# Patient Record
Sex: MALE | Race: WHITE | NOT HISPANIC OR LATINO | Employment: FULL TIME | URBAN - METROPOLITAN AREA
[De-identification: names, ages, dates, MRNs, and addresses within clinical notes are randomized per-mention and may not be internally consistent; named-entity substitution may affect disease eponyms.]

---

## 2017-01-06 ENCOUNTER — OFFICE VISIT - HEALTHEAST (OUTPATIENT)
Dept: INTERNAL MEDICINE | Facility: CLINIC | Age: 67
End: 2017-01-06

## 2017-01-06 DIAGNOSIS — M10.9 GOUT ATTACK: ICD-10-CM

## 2017-01-06 DIAGNOSIS — Z00.00 HEALTH CARE MAINTENANCE: ICD-10-CM

## 2017-01-06 DIAGNOSIS — I25.10 ASCVD (ARTERIOSCLEROTIC CARDIOVASCULAR DISEASE): ICD-10-CM

## 2017-01-06 DIAGNOSIS — E78.5 HYPERLIPIDEMIA: ICD-10-CM

## 2017-01-06 RX ORDER — NITROGLYCERIN 0.4 MG/1
0.4 TABLET SUBLINGUAL
Status: SHIPPED | COMMUNITY
Start: 2014-12-18

## 2017-01-06 RX ORDER — ASCORBIC ACID 500 MG
500 TABLET ORAL
Status: SHIPPED | COMMUNITY
Start: 2017-01-06

## 2017-01-06 RX ORDER — MULTIVIT-MIN/IRON FUM/FOLIC AC 7.5 MG-4
1 TABLET ORAL
Status: SHIPPED | COMMUNITY
Start: 2017-01-06

## 2017-01-07 ENCOUNTER — COMMUNICATION - HEALTHEAST (OUTPATIENT)
Dept: INTERNAL MEDICINE | Facility: CLINIC | Age: 67
End: 2017-01-07

## 2017-06-06 ENCOUNTER — COMMUNICATION - HEALTHEAST (OUTPATIENT)
Dept: INTERNAL MEDICINE | Facility: CLINIC | Age: 67
End: 2017-06-06

## 2017-06-06 ENCOUNTER — OFFICE VISIT - HEALTHEAST (OUTPATIENT)
Dept: INTERNAL MEDICINE | Facility: CLINIC | Age: 67
End: 2017-06-06

## 2017-06-06 DIAGNOSIS — Z12.11 SCREENING FOR COLON CANCER: ICD-10-CM

## 2017-06-06 DIAGNOSIS — Z00.00 HEALTH CARE MAINTENANCE: ICD-10-CM

## 2017-06-06 DIAGNOSIS — Z23 NEED FOR PROPHYLACTIC VACCINATION WITH TETANUS-DIPHTHERIA (TD): ICD-10-CM

## 2017-06-06 DIAGNOSIS — Z98.61 S/P CORONARY ANGIOPLASTY: ICD-10-CM

## 2017-06-06 DIAGNOSIS — M10.9 GOUTY ARTHROPATHY: ICD-10-CM

## 2017-06-06 DIAGNOSIS — Z12.5 PROSTATE CANCER SCREENING: ICD-10-CM

## 2017-06-06 DIAGNOSIS — Z95.5 S/P CORONARY ARTERY STENT PLACEMENT: ICD-10-CM
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
## 2017-06-06 DIAGNOSIS — E78.5 HYPERLIPEMIA: ICD-10-CM

## 2017-06-06 LAB — LDLC SERPL CALC-MCNC: 64 MG/DL

## 2017-06-06 ASSESSMENT — MIFFLIN-ST. JEOR: SCORE: 1597

## 2017-06-07 LAB — PSA SERPL-MCNC: 0.6 NG/ML (ref 0–4.5)

## 2017-07-03 ENCOUNTER — OFFICE VISIT - HEALTHEAST (OUTPATIENT)
Dept: INTERNAL MEDICINE | Facility: CLINIC | Age: 67
End: 2017-07-03

## 2017-07-03 ENCOUNTER — COMMUNICATION - HEALTHEAST (OUTPATIENT)
Dept: SCHEDULING | Facility: CLINIC | Age: 67
End: 2017-07-03

## 2017-07-03 DIAGNOSIS — M10.9 GOUT: ICD-10-CM

## 2017-07-05 ENCOUNTER — COMMUNICATION - HEALTHEAST (OUTPATIENT)
Dept: INTERNAL MEDICINE | Facility: CLINIC | Age: 67
End: 2017-07-05

## 2017-07-10 ENCOUNTER — COMMUNICATION - HEALTHEAST (OUTPATIENT)
Dept: SCHEDULING | Facility: CLINIC | Age: 67
End: 2017-07-10

## 2017-10-16 ENCOUNTER — COMMUNICATION - HEALTHEAST (OUTPATIENT)
Dept: SCHEDULING | Facility: CLINIC | Age: 67
End: 2017-10-16

## 2017-10-17 ENCOUNTER — AMBULATORY - HEALTHEAST (OUTPATIENT)
Dept: INTERNAL MEDICINE | Facility: CLINIC | Age: 67
End: 2017-10-17

## 2018-01-22 ENCOUNTER — AMBULATORY - HEALTHEAST (OUTPATIENT)
Dept: LAB | Facility: CLINIC | Age: 68
End: 2018-01-22

## 2018-01-22 DIAGNOSIS — I25.83 CORONARY ARTERY DISEASE DUE TO LIPID RICH PLAQUE: ICD-10-CM

## 2018-01-22 DIAGNOSIS — I25.10 CORONARY ARTERY DISEASE DUE TO LIPID RICH PLAQUE: ICD-10-CM

## 2018-01-31 ENCOUNTER — COMMUNICATION - HEALTHEAST (OUTPATIENT)
Dept: INTERNAL MEDICINE | Facility: CLINIC | Age: 68
End: 2018-01-31

## 2018-02-23 ENCOUNTER — OFFICE VISIT - HEALTHEAST (OUTPATIENT)
Dept: INTERNAL MEDICINE | Facility: CLINIC | Age: 68
End: 2018-02-23

## 2018-02-23 ENCOUNTER — HOSPITAL ENCOUNTER (OUTPATIENT)
Dept: LAB | Age: 68
Setting detail: SPECIMEN
Discharge: HOME | End: 2018-02-23

## 2018-02-23 DIAGNOSIS — Z98.61 S/P CORONARY ANGIOPLASTY: ICD-10-CM

## 2018-02-23 DIAGNOSIS — I25.83 CORONARY ARTERY DISEASE DUE TO LIPID RICH PLAQUE: ICD-10-CM

## 2018-02-23 DIAGNOSIS — Z95.5 S/P CORONARY ARTERY STENT PLACEMENT: ICD-10-CM

## 2018-02-23 DIAGNOSIS — E78.5 HYPERLIPEMIA: ICD-10-CM

## 2018-02-23 DIAGNOSIS — M75.100 ROTATOR CUFF TEAR: ICD-10-CM

## 2018-02-23 DIAGNOSIS — Z95.5 S/P DRUG ELUTING CORONARY STENT PLACEMENT: ICD-10-CM

## 2018-02-23 DIAGNOSIS — I25.10 CORONARY ARTERY DISEASE DUE TO LIPID RICH PLAQUE: ICD-10-CM

## 2018-02-23 DIAGNOSIS — Z01.818 PREOP EXAM FOR INTERNAL MEDICINE: ICD-10-CM

## 2018-02-23 DIAGNOSIS — M10.9 GOUT: ICD-10-CM

## 2018-02-23 LAB
ALBUMIN SERPL-MCNC: 4.1 G/DL (ref 3.5–5)
ALP SERPL-CCNC: 83 U/L (ref 45–120)
ALT SERPL W P-5'-P-CCNC: 27 U/L (ref 0–45)
ANION GAP SERPL CALCULATED.3IONS-SCNC: 9 MMOL/L (ref 5–18)
AST SERPL W P-5'-P-CCNC: 26 U/L (ref 0–40)
BILIRUB SERPL-MCNC: 2 MG/DL (ref 0–1)
BUN SERPL-MCNC: 21 MG/DL (ref 8–22)
CALCIUM SERPL-MCNC: 9.4 MG/DL (ref 8.5–10.5)
CHLORIDE BLD-SCNC: 105 MMOL/L (ref 98–107)
CHOLEST SERPL-MCNC: 167 MG/DL
CO2 SERPL-SCNC: 25 MMOL/L (ref 22–31)
CREAT SERPL-MCNC: 1.07 MG/DL (ref 0.7–1.3)
ERYTHROCYTE [DISTWIDTH] IN BLOOD BY AUTOMATED COUNT: 11 % (ref 11–14.5)
FASTING STATUS PATIENT QL REPORTED: YES
GFR SERPL CREATININE-BSD FRML MDRD: >60 ML/MIN/1.73M2
GLUCOSE BLD-MCNC: 90 MG/DL (ref 70–125)
HCT VFR BLD AUTO: 42.2 % (ref 40–54)
HDLC SERPL-MCNC: 63 MG/DL
HGB BLD-MCNC: 14.6 G/DL (ref 14–18)
LDLC SERPL CALC-MCNC: 90 MG/DL
MCH RBC QN AUTO: 32.2 PG (ref 27–34)
MCHC RBC AUTO-ENTMCNC: 34.6 G/DL (ref 32–36)
MCV RBC AUTO: 93 FL (ref 80–100)
PLATELET # BLD AUTO: 206 THOU/UL (ref 140–440)
PMV BLD AUTO: 7 FL (ref 7–10)
POTASSIUM BLD-SCNC: 4 MMOL/L (ref 3.5–5)
PROT SERPL-MCNC: 7 G/DL (ref 6–8)
RBC # BLD AUTO: 4.53 MILL/UL (ref 4.4–6.2)
SODIUM SERPL-SCNC: 139 MMOL/L (ref 136–145)
TRIGL SERPL-MCNC: 71 MG/DL
URATE SERPL-MCNC: 6.3 MG/DL (ref 3–8)
WBC: 5.5 THOU/UL (ref 4–11)

## 2018-02-23 ASSESSMENT — MIFFLIN-ST. JEOR: SCORE: 1548.93

## 2018-02-24 LAB
ATRIAL RATE - MUSE: 52 BPM
DIASTOLIC BLOOD PRESSURE - MUSE: NORMAL MMHG
INTERPRETATION ECG - MUSE: NORMAL
P AXIS - MUSE: 57 DEGREES
PR INTERVAL - MUSE: 184 MS
QRS DURATION - MUSE: 96 MS
QT - MUSE: 444 MS
QTC - MUSE: 412 MS
R AXIS - MUSE: -7 DEGREES
SYSTOLIC BLOOD PRESSURE - MUSE: NORMAL MMHG
T AXIS - MUSE: 9 DEGREES
VENTRICULAR RATE- MUSE: 52 BPM

## 2018-02-27 ENCOUNTER — COMMUNICATION - HEALTHEAST (OUTPATIENT)
Dept: INTERNAL MEDICINE | Facility: CLINIC | Age: 68
End: 2018-02-27

## 2018-06-30 ENCOUNTER — COMMUNICATION - HEALTHEAST (OUTPATIENT)
Dept: INTERNAL MEDICINE | Facility: CLINIC | Age: 68
End: 2018-06-30

## 2018-06-30 DIAGNOSIS — M10.9 GOUT: ICD-10-CM

## 2018-12-11 ENCOUNTER — OFFICE VISIT - HEALTHEAST (OUTPATIENT)
Dept: INTERNAL MEDICINE | Facility: CLINIC | Age: 68
End: 2018-12-11

## 2018-12-11 DIAGNOSIS — M10.9 GOUTY ARTHROPATHY: ICD-10-CM

## 2018-12-19 ENCOUNTER — COMMUNICATION - HEALTHEAST (OUTPATIENT)
Dept: INTERNAL MEDICINE | Facility: CLINIC | Age: 68
End: 2018-12-19

## 2018-12-19 DIAGNOSIS — M10.9 GOUT: ICD-10-CM

## 2019-01-29 ENCOUNTER — COMMUNICATION - HEALTHEAST (OUTPATIENT)
Dept: INTERNAL MEDICINE | Facility: CLINIC | Age: 69
End: 2019-01-29

## 2019-05-13 ENCOUNTER — COMMUNICATION - HEALTHEAST (OUTPATIENT)
Dept: INTERNAL MEDICINE | Facility: CLINIC | Age: 69
End: 2019-05-13

## 2019-05-22 ENCOUNTER — COMMUNICATION - HEALTHEAST (OUTPATIENT)
Dept: INTERNAL MEDICINE | Facility: CLINIC | Age: 69
End: 2019-05-22

## 2019-05-22 DIAGNOSIS — M10.9 GOUT: ICD-10-CM

## 2020-06-13 ENCOUNTER — COMMUNICATION - HEALTHEAST (OUTPATIENT)
Dept: INTERNAL MEDICINE | Facility: CLINIC | Age: 70
End: 2020-06-13

## 2020-06-13 DIAGNOSIS — M10.9 GOUT: ICD-10-CM

## 2020-11-02 ENCOUNTER — COMMUNICATION - HEALTHEAST (OUTPATIENT)
Dept: INTERNAL MEDICINE | Facility: CLINIC | Age: 70
End: 2020-11-02

## 2020-11-02 DIAGNOSIS — M10.9 GOUT: ICD-10-CM

## 2020-11-02 RX ORDER — ALLOPURINOL 100 MG/1
TABLET ORAL
Qty: 90 TABLET | Refills: 3 | Status: SHIPPED | OUTPATIENT
Start: 2020-11-02 | End: 2021-10-19

## 2021-01-04 ENCOUNTER — OFFICE VISIT - HEALTHEAST (OUTPATIENT)
Dept: FAMILY MEDICINE | Facility: CLINIC | Age: 71
End: 2021-01-04

## 2021-01-04 DIAGNOSIS — L72.3 SEBACEOUS CYST: ICD-10-CM

## 2021-01-04 DIAGNOSIS — L72.0 MILIA: ICD-10-CM

## 2021-01-04 DIAGNOSIS — I25.10 CORONARY ARTERY DISEASE DUE TO LIPID RICH PLAQUE: ICD-10-CM

## 2021-01-04 DIAGNOSIS — M1A.09X0 IDIOPATHIC CHRONIC GOUT OF MULTIPLE SITES WITHOUT TOPHUS: ICD-10-CM

## 2021-01-04 DIAGNOSIS — Z00.00 ROUTINE MEDICAL EXAM: ICD-10-CM

## 2021-01-04 DIAGNOSIS — I25.83 CORONARY ARTERY DISEASE DUE TO LIPID RICH PLAQUE: ICD-10-CM

## 2021-01-04 ASSESSMENT — MIFFLIN-ST. JEOR: SCORE: 1538.26

## 2021-01-15 ENCOUNTER — COMMUNICATION - HEALTHEAST (OUTPATIENT)
Dept: SCHEDULING | Facility: CLINIC | Age: 71
End: 2021-01-15

## 2021-01-21 ENCOUNTER — AMBULATORY - HEALTHEAST (OUTPATIENT)
Dept: FAMILY MEDICINE | Facility: CLINIC | Age: 71
End: 2021-01-21

## 2021-01-21 DIAGNOSIS — L72.3 SEBACEOUS CYST: ICD-10-CM

## 2021-05-20 ENCOUNTER — COMMUNICATION - HEALTHEAST (OUTPATIENT)
Dept: LAB | Facility: CLINIC | Age: 71
End: 2021-05-20

## 2021-05-20 DIAGNOSIS — Z12.5 SCREENING FOR MALIGNANT NEOPLASM OF PROSTATE: ICD-10-CM

## 2021-05-20 DIAGNOSIS — I25.83 CORONARY ARTERY DISEASE DUE TO LIPID RICH PLAQUE: ICD-10-CM

## 2021-05-20 DIAGNOSIS — I25.10 CORONARY ARTERY DISEASE DUE TO LIPID RICH PLAQUE: ICD-10-CM

## 2021-05-28 ENCOUNTER — RECORDS - HEALTHEAST (OUTPATIENT)
Dept: ADMINISTRATIVE | Facility: CLINIC | Age: 71
End: 2021-05-28

## 2021-05-29 NOTE — TELEPHONE ENCOUNTER
Former patient of Dr. Curtis & has not established care with another provider.  Please assign refill request to covering provider per Clinic standard process.    RN cannot approve Refill Request    RN can NOT refill this medication overdue for office visits and/or labs.    Kd Perry, Care Connection Triage/Med Refill 5/22/2019    Requested Prescriptions   Pending Prescriptions Disp Refills     allopurinol (ZYLOPRIM) 100 MG tablet [Pharmacy Med Name: ALLOPURINOL 100 MG TABLET] 90 tablet 0     Sig: TAKE 1 TABLET BY MOUTH DAILY       Allopurinol/Febuxostat Refill Protocol  Failed - 5/22/2019 11:33 AM        Failed - LFT or AST or ALT in last 12 months     Albumin   Date Value Ref Range Status   02/23/2018 4.1 3.5 - 5.0 g/dL Final     Bilirubin, Total   Date Value Ref Range Status   02/23/2018 2.0 (H) 0.0 - 1.0 mg/dL Final     Alkaline Phosphatase   Date Value Ref Range Status   02/23/2018 83 45 - 120 U/L Final     AST   Date Value Ref Range Status   02/23/2018 26 0 - 40 U/L Final     ALT   Date Value Ref Range Status   02/23/2018 27 0 - 45 U/L Final     Protein, Total   Date Value Ref Range Status   02/23/2018 7.0 6.0 - 8.0 g/dL Final                Passed - Visit with PCP or prescribing provider visit in past 12 months     Last office visit with prescriber/PCP: 7/3/2017 Del Curtis MD OR same dept: 12/11/2018 Dannie Snider MD OR same specialty: 12/11/2018 Dannie Snider MD  Last physical: 2/23/2018 Last MTM visit: Visit date not found   Next visit within 3 mo: Visit date not found  Next physical within 3 mo: Visit date not found  Prescriber OR PCP: Del Curtis MD  Last diagnosis associated with med order: 1. Gout  - allopurinol (ZYLOPRIM) 100 MG tablet [Pharmacy Med Name: ALLOPURINOL 100 MG TABLET]; TAKE 1 TABLET BY MOUTH DAILY  Dispense: 90 tablet; Refill: 0    If protocol passes may refill for 12 months if within 3 months of last provider visit (or a total of 15 months).

## 2021-05-30 VITALS — WEIGHT: 174.8 LBS

## 2021-05-31 VITALS — BODY MASS INDEX: 24.13 KG/M2 | HEIGHT: 72 IN | WEIGHT: 178.13 LBS

## 2021-05-31 VITALS — WEIGHT: 177.6 LBS | BODY MASS INDEX: 24.26 KG/M2

## 2021-06-01 ENCOUNTER — RECORDS - HEALTHEAST (OUTPATIENT)
Dept: ADMINISTRATIVE | Facility: CLINIC | Age: 71
End: 2021-06-01

## 2021-06-01 VITALS — WEIGHT: 172 LBS | BODY MASS INDEX: 24.62 KG/M2 | HEIGHT: 70 IN

## 2021-06-05 VITALS
SYSTOLIC BLOOD PRESSURE: 130 MMHG | WEIGHT: 178.1 LBS | OXYGEN SATURATION: 98 % | BODY MASS INDEX: 25.55 KG/M2 | HEART RATE: 57 BPM | DIASTOLIC BLOOD PRESSURE: 80 MMHG

## 2021-06-05 VITALS
WEIGHT: 171.3 LBS | DIASTOLIC BLOOD PRESSURE: 78 MMHG | HEIGHT: 70 IN | SYSTOLIC BLOOD PRESSURE: 116 MMHG | BODY MASS INDEX: 24.52 KG/M2 | HEART RATE: 62 BPM | OXYGEN SATURATION: 98 %

## 2021-06-08 NOTE — PROGRESS NOTES
ASSESSMENT/PLAN:  1. Gout attack  The erythematous, warm, enflamed greater and second toes are suspicious of gout. Given his history of previous gout and lack of other signs of local or systemic infection, and infectious etiology is not likely;  Pseudogout cannot be ruled out either.  The plan is to check a uric acid level today and to begin a short duration of prednisone for the inflammation.  He agreed to the plan.  He was also given a hand out on low purine diet and educated about prevention of gout.  The plan is to follow up for further management or resolution at his physical with Dr Curtis on 1/24/16 or as needed.         Medications Discontinued During This Encounter   Medication Reason     prasugrel (EFFIENT) 10 mg Tab tablet Therapy completed       ASSESSED PROBLEMS:  1. Gout attack  predniSONE (DELTASONE) 20 MG tablet    Uric Acid   2. Hyperlipidemia  atorvastatin (LIPITOR) 80 MG tablet   3. ASCVD (arteriosclerotic cardiovascular disease)  atorvastatin (LIPITOR) 80 MG tablet    clopidogrel (PLAVIX) 75 mg tablet    nitroglycerin (NITROSTAT) 0.4 MG SL tablet   4. Health care maintenance  ascorbic acid, vitamin C, (VITAMIN C) 500 MG tablet    multivitamin with minerals tablet       CHIEF COMPLAINT:  Chief Complaint   Patient presents with     Pain     left foot first digit pain since 12-7-2016 now swelling and red       HISTORY OF PRESENT ILLNESS:  Boyd is a 66 y.o. male presenting to the clinic today for inflammation of his left grater toe and left second toe .  Swelling and inflammation began suddenly when he awoke in the morning on 12/27/16.  Initially, the swelling was limited to his left heel, but the heel pain and inflamation dissipated after the first few days as his left great metatarsal head and second toe began to hurt, especially when he stands on his left foot.  The second toe inflammation has decreased in the past few days and the discomfort is much improved as well. He has had gout flare ups  on three other occasions in the past few years. He can usually anticipate a gouty attack through some anomalous prescience lets him know he is about to have an attack, so he increases his water intake.  He avoids red wine, he does not drink very often, avoids excessive meat intake, but would like information about foods to avoid. He went on a skiing trip in [place] and had to stay at the [facility] and read due to his discomfort.  He had an AMI in July and is on Plavix, he stopped his Plavix and started ibuprofen for two days.  He informed his cardiologist and was told to restart the Plavix, and he was told to see his general practitioner for the toe inflammation.  His uric acid in 2012 was normal at 6.1.      REVIEW OF SYSTEMS:    He has a history of an MI in July. He denies and heart palpitation, chest pain, or shortness of breath with exertion.  He denied any fever, chills, malaise, cough, change in urin or stool patterns, hematochezia, abdominal pain, dysuria, or hematuria.   All other systems are negative.    PFSH:  He skis with his family annually in [place], but had to forego skiing this year due to gout, as above.  He is active otherwise. He does not drink. He does not enjoy anchovies.     TOBACCO USE:  History   Smoking Status     Never Smoker   Smokeless Tobacco     Not on file       VITALS:  Vitals:    01/06/17 0945   BP: 112/84   Patient Site: Left Arm   Patient Position: Sitting   Cuff Size: Adult Regular   Pulse: 80   Weight: 174 lb 12.8 oz (79.3 kg)     Wt Readings from Last 3 Encounters:   01/06/17 174 lb 12.8 oz (79.3 kg)   12/26/14 175 lb 1.6 oz (79.4 kg)     There is no height or weight on file to calculate BMI.    PHYSICAL EXAM:  Student Physical Exam:  Constitutional:   Reveals a pleasant, well groomed,  male.  Vitals: per nursing notes.  HEENT:  Ears:  External canals, TMs clear.  Eyes:  Conjunctiva pink.  Oropharynx:   Mouth and throat pink and free of thrush, exudate, or  lesions.  Neck:  Supple, no adenopathy.  Lungs: Clear to anterior posterior and lateral; Respiratory effort normal.  Cardiac:   Regular rate and rhythm, normal S1, S2, no murmur or gallop. Radial pulse regular.  Extremities:  No edema.  Left first toe metatarsal phlangeal joint and left second toe PIP red, swollen, enflamed, warm, painful to palpation.    Skin:  No jaundice, peripheral cyanosis or lesions.  Toenail: nail fungus on all left toe nails.   Neuro:  Alert and oriented. Cranial nerves, motor, sensory exams are intact.  No gross focal deficits.  Psychiatric:  Memory intact, mood appropriate.    ADDITIONAL HISTORY SUMMARIZED (2): Notes from Dr. Curtis from 4/9/2014 reviewed regarding gout.  DECISION TO OBTAIN EXTRA INFORMATION (1): Care everywhere accessed.   RADIOLOGY TESTS (1): None.  LABS (1): Labs ordered.  MEDICINE TESTS (1): None.  INDEPENDENT REVIEW (2 each): None.     The visit lasted a total of 21 minutes face to face with the patient. Over 50% of the time was spent counseling and educating the patient about Gout. An untimed portion of this visit was spent with Abraham Chaney, a NP student. This portion was spent gathering history and this history along with the history of Dr. Vizcaino has been merged as seen above in the HPI, ROS, and PFSH.    I, Moses Mckenzie, am scribing for and in the presence of, Dr. Vizcaino.    I, Dr. Vizcaino, personally performed the services described in this documentation, as scribed by Moses Mckenzie in my presence, and it is both accurate and complete.    MEDICATIONS:  Current Outpatient Prescriptions   Medication Sig Dispense Refill     ascorbic acid, vitamin C, (VITAMIN C) 500 MG tablet Take 500 mg by mouth.       aspirin 81 MG EC tablet Take 81 mg by mouth daily.       atorvastatin (LIPITOR) 80 MG tablet Take 80 mg by mouth.       clopidogrel (PLAVIX) 75 mg tablet Take 75 mg by mouth.       multivitamin with minerals tablet Take 1 tablet by mouth.       nitroglycerin  (NITROSTAT) 0.4 MG SL tablet Place 0.4 mg under the tongue.       No current facility-administered medications for this visit.        Total data points: 4.

## 2021-06-11 NOTE — PROGRESS NOTES
Assessment and Plan:   Chart reviewed and updated. Goes to Dr. Burger for CAD- last intervention July RCA- stent. asymptomatic    1. Health care maintenance  Routine labs ordered    2. Screening for colon cancer  No interested and again refuses colonoscopy/stool heme tests- we've discussed this for years    3. Need for prophylactic vaccination with tetanus-diphtheria (TD)    - Td, Adult, Adsorbed (blue label)  - Pneumococcal polysaccharide vaccine 23-valent 1 yo or older, subq/IM    4. Hyperlipemia  On lipitor - 80 same  - Lipid Cascade  - Comprehensive Metabolic Panel  - Thyroid Stimulating Hormone (TSH)  - HM2(CBC w/o Differential)    5. CAD stable goes to [facility]    6. Tick bite yesterday- one dose of doxy ordered, prophylaxis    The patient's current medical problems were reviewed.    I have had an Advance Directives discussion with the patient.  The following health maintenance schedule was reviewed with the patient and provided in printed form in the after visit summary:   Health Maintenance   Topic Date Due     COLONOSCOPY  09/16/1967     ZOSTER VACCINE  09/16/2009     PNEUMOCOCCAL POLYSACCHARIDE VACCINE AGE 65 AND OVER  09/16/2014     PNEUMOCOCCAL CONJUGATE VACCINE FOR ADULTS (PCV13 OR PREVNAR)  09/16/2014     FALL RISK ASSESSMENT  01/06/2018     ADVANCE DIRECTIVES DISCUSSED WITH PATIENT  07/15/2018     TD 18+ HE  06/06/2027     INFLUENZA VACCINE RULE BASED  Addressed        Subjective:   Chief Complaint: Boyd Veras is an 66 y.o. male here for an Annual Wellness visit.   HPI:  Doing well has CAD and strong FH. On statin and goes to Cards at [facility]. Not interested in Colon Ca screen all else is fine    Review of Systems:    Please see above.  The rest of the review of systems are negative for all systems.    Patient Care Team:  Del Curtis MD as PCP - General     Patient Active Problem List   Diagnosis     Gout     Benign Skin Neoplasm     Hyperlipidemia     NSTEMI (non-ST elevated  "myocardial infarction)     Coronary artery disease     S/P coronary artery stent placement     Sinus bradycardia     Disorder of rotator cuff     Family history of coronary artery disease     Rupture of biceps tendon     S/P coronary angioplasty     Past Medical History:   Diagnosis Date     Gout      Hyperlipidemia      Stented coronary artery Dec 2014    @[facility]; Dr. David Nicholson      No past surgical history on file.   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       aspirin 81 MG EC tablet Take 81 mg by mouth daily.       atorvastatin (LIPITOR) 80 MG tablet Take 80 mg by mouth.       clopidogrel (PLAVIX) 75 mg tablet Take 75 mg by mouth.       doxycycline (VIBRA-TABS) 100 MG tablet Two tablets one time dose (tick bite prophylaxis) 2 tablet 0     multivitamin with minerals tablet Take 1 tablet by mouth.       nitroglycerin (NITROSTAT) 0.4 MG SL tablet Place 0.4 mg under the tongue.       No current facility-administered medications for this visit.       Objective:   Vital Signs:   Visit Vitals     /78 (Patient Site: Left Arm, Patient Position: Sitting, Cuff Size: Adult Regular)     Pulse (!) 54     Resp 16     Ht 5' 11.75\" (1.822 m)     Wt 178 lb 2 oz (80.8 kg)     SpO2 99%     BMI 24.33 " kg/m2        VisionScreening:  No exam data present     PHYSICAL EXAM  Healthy appearing. Looks younger than stated age.   HEENT- negative  Neck- no bruits  Chest- clear  Cor - regular no murmur  Abd- scaphoid/benign  Ext - good distal pulse  Skin- small inclusion cyst right buttock area. Not suspicious. No evidence of tick bite or rash left upper chest  Neuro- negative    Assessment Results 6/6/2017   Activities of Daily Living No help needed   Instrumental Activities of Daily Living No help needed   Get Up and Go Score Less than 12 seconds   Mini Cog Total Score 3     A Mini-Cog score of 0-2 suggests the possibility of dementia, score of 3-5 suggests no dementia    Identified Health Risks:     Information regarding advance directives (living porter), including where he can download the appropriate form, was provided to the patient via the AVS.     The last exams were  Recent Results (from the past 240 hour(s))   Comprehensive Metabolic Panel   Result Value Ref Range    Sodium 141 136 - 145 mmol/L    Potassium 4.2 3.5 - 5.0 mmol/L    Chloride 106 98 - 107 mmol/L    CO2 25 22 - 31 mmol/L    Anion Gap, Calculation 10 5 - 18 mmol/L    Glucose 92 70 - 125 mg/dL    BUN 21 8 - 22 mg/dL    Creatinine 0.97 0.70 - 1.30 mg/dL    GFR MDRD Af Amer >60 >60 mL/min/1.73m2    GFR MDRD Non Af Amer >60 >60 mL/min/1.73m2    Bilirubin, Total 2.1 (H) 0.0 - 1.0 mg/dL    Calcium 9.3 8.5 - 10.5 mg/dL    Protein, Total 6.9 6.0 - 8.0 g/dL    Albumin 4.0 3.5 - 5.0 g/dL    Alkaline Phosphatase 85 45 - 120 U/L    AST 26 0 - 40 U/L    ALT 26 0 - 45 U/L   Thyroid Stimulating Hormone (TSH)   Result Value Ref Range    TSH 1.20 0.30 - 5.00 uIU/mL   HM2(CBC w/o Differential)   Result Value Ref Range    WBC 4.9 4.0 - 11.0 thou/uL    RBC 4.53 4.40 - 6.20 mill/uL    Hemoglobin 14.0 14.0 - 18.0 g/dL    Hematocrit 40.4 40.0 - 54.0 %    MCV 89 80 - 100 fL    MCH 30.8 27.0 - 34.0 pg    MCHC 34.5 32.0 - 36.0 g/dL    RDW 10.9 (L) 11.0 - 14.5 %    Platelets  187 140 - 440 thou/uL    MPV 6.7 (L) 7.0 - 10.0 fL   LDL Cholesterol, Direct   Result Value Ref Range    Direct LDL 64 <=129 mg/dl

## 2021-06-11 NOTE — PROGRESS NOTES
Boyd Veras  1950      Assessment and Plan:  1. Likely gouty achilles tendonitis resolving with prednisone  2. 3rd episode of gout past 12 months- begin allopurinol; get uric acid at least <6.       Chief Complaint: gout     Visit diagnoses:    1. Gout  predniSONE (DELTASONE) 20 MG tablet    allopurinol (ZYLOPRIM) 100 MG tablet    Uric Acid       Meds:  Current Outpatient Prescriptions   Medication Sig Dispense Refill     ascorbic acid, vitamin C, (VITAMIN C) 500 MG tablet Take 500 mg by mouth.       aspirin 81 MG EC tablet Take 81 mg by mouth daily.       atorvastatin (LIPITOR) 80 MG tablet Take 80 mg by mouth.       clopidogrel (PLAVIX) 75 mg tablet Take 75 mg by mouth.       magnesium oxide 500 mg Tab Take by mouth.       multivitamin with minerals tablet Take 1 tablet by mouth.       nitroglycerin (NITROSTAT) 0.4 MG SL tablet Place 0.4 mg under the tongue.       allopurinol (ZYLOPRIM) 100 MG tablet Take 1 tablet (100 mg total) by mouth daily. (to prevent gout) 90 tablet 3     predniSONE (DELTASONE) 20 MG tablet Two po daily with food for 5 days for gout attack. 10 tablet 1     No current facility-administered medications for this visit.        Allergies   Allergen Reactions     Isosorbide Mononitrate Headache       ROS: complete review of symptoms otherwise negative except as noted below    S: Rapid onset of left Achilles tendinitis pain his heel does not bother him so it is not likely plantar fasciitis.  He started taking prednisone and now he can walk this looks like it is the third attack of gout the past 12 months his uric acid was 6.2 going to recheck it today he will need to start on allopurinol       O:   Vitals:    07/03/17 1548   BP: 122/82   Patient Site: Left Arm   Patient Position: Sitting   Cuff Size: Adult Regular   Pulse: 70   Weight: 177 lb 9.6 oz (80.6 kg)       Physical Exam:  General-  VS- see above    Extremities: no edema, good distal pulses; no heel tenderness or evidence of  plantar fasciitis on the left the left Achilles tendon now is essentially normal    Recent Results (from the past 240 hour(s))   Uric Acid   Result Value Ref Range    Uric Acid 6.9 3.0 - 8.0 mg/dL           Del Curtis MD

## 2021-06-12 NOTE — TELEPHONE ENCOUNTER
RN cannot approve Refill Request    RN can NOT refill this medication Protocol failed and NO refill given. Last office visit: Visit date not found Last Physical: Visit date not found Last MTM visit: Visit date not found Last visit same specialty: 12/11/2018 Dannie Snider MD.  Next visit within 3 mo: Visit date not found  Next physical within 3 mo: Visit date not found      Sandy JASON [place], [facility] Connection Triage/Med Refill 11/2/2020    Requested Prescriptions   Pending Prescriptions Disp Refills     allopurinoL (ZYLOPRIM) 100 MG tablet [Pharmacy Med Name: ALLOPURINOL 100 MG TABLET] 90 tablet 3     Sig: TAKE 1 TABLET BY MOUTH EVERY DAY       Allopurinol/Febuxostat Refill Protocol  Failed - 11/2/2020 11:11 AM        Failed - LFT or AST or ALT in last 12 months     Albumin   Date Value Ref Range Status   02/23/2018 4.1 3.5 - 5.0 g/dL Final     Bilirubin, Total   Date Value Ref Range Status   02/23/2018 2.0 (H) 0.0 - 1.0 mg/dL Final     Alkaline Phosphatase   Date Value Ref Range Status   02/23/2018 83 45 - 120 U/L Final     AST   Date Value Ref Range Status   02/23/2018 26 0 - 40 U/L Final     ALT   Date Value Ref Range Status   02/23/2018 27 0 - 45 U/L Final     Protein, Total   Date Value Ref Range Status   02/23/2018 7.0 6.0 - 8.0 g/dL Final                Failed - Visit with PCP or prescribing provider visit in past 12 months     Last office visit with prescriber/PCP: Visit date not found OR same dept: Visit date not found OR same specialty: 12/11/2018 Dannie Snider MD  Last physical: Visit date not found Last MTM visit: Visit date not found   Next visit within 3 mo: Visit date not found  Next physical within 3 mo: Visit date not found  Prescriber OR PCP: Markel Hanna MD  Last diagnosis associated with med order: 1. Gout  - allopurinoL (ZYLOPRIM) 100 MG tablet [Pharmacy Med Name: ALLOPURINOL 100 MG TABLET]; TAKE 1 TABLET BY MOUTH EVERY DAY  Dispense: 90 tablet; Refill: 3    If protocol passes may refill for  12 months if within 3 months of last provider visit (or a total of 15 months).

## 2021-06-12 NOTE — TELEPHONE ENCOUNTER
Refill Request  Did you contact pharmacy: Yes  Medication name:   Requested Prescriptions     Pending Prescriptions Disp Refills     allopurinoL (ZYLOPRIM) 100 MG tablet [Pharmacy Med Name: ALLOPURINOL 100 MG TABLET] 90 tablet 3     Sig: TAKE 1 TABLET BY MOUTH EVERY DAY     Who prescribed the medication: Markel Hanna MD  Requested Pharmacy: [facility]  Is patient out of medication: No.  1 days left  Patient notified refills processed in 3 business days:  yes  Okay to leave a detailed message: yes

## 2021-06-14 NOTE — PROGRESS NOTES
"  Assessment and Plan:     Patient has been advised of split billing requirements and indicates understanding: Yes  1. Routine medical exam    Generally the patient is doing very well.  We discussed healthy lifestyles, including adequate exercise (3-4 times a week for 20-30 minutes), and a healthy diet.  Patient should return for annual physicals, and we can also see them here as needed.         2. Coronary artery disease due to lipid rich plaque    My concern with this is that he has been off of his atorvastatin now for some time because he \"just did not like taking it\".  He is not fasting today so we will have him come back fasting and check the cholesterol and if he is still high were going to have to have a bit of a conversation about putting him back on some medication even if it is a lower dose than the 80 mg that he was on before.  We talked about decreasing the risk of his having another heart attack and he has been try to work on diet a bit and has been trying to increase his exercise as well.    3. Idiopathic chronic gout of multiple sites without tophus    He continues to take the allopurinol at 100 mg a day.  He will skip about every third or fourth day at times as he is just concerned about being on a medication for long-term.  We talked about this being a very safe medication and that he should not have any concerns with it.  We can also check his uric acid level when he returns for labs.    4. Sebaceous cyst    The sebaceous cyst is pretty small and an easily accessible area so I told him he could make an appointment to come on back and see us to have it removed and he will do that at his leisure.    5. Milia    He just has a couple of small milia around his eyelids and we can do that when he comes back in for the cyst at the same time.        The patient's current medical problems were reviewed.    I have had an Advance Directives discussion with the patient.  The following health maintenance " schedule was reviewed with the patient and provided in printed form in the after visit summary:   Health Maintenance   Topic Date Due     HEPATITIS C SCREENING  1950     COLORECTAL CANCER SCREENING  09/16/1967     ZOSTER VACCINES (1 of 2) 09/16/1999     Pneumococcal Vaccine: 65+ Years (1 of 1 - PPSV23) 09/16/2014     MEDICARE ANNUAL WELLNESS VISIT  01/04/2022     FALL RISK ASSESSMENT  01/04/2022     ADVANCE CARE PLANNING  06/06/2022     LIPID  02/23/2023     TD 18+ HE  06/06/2027     Pneumococcal Vaccine: Pediatrics (0 to 5 Years) and At-Risk Patients (6 to 64 Years)  Aged Out     HEPATITIS B VACCINES  Aged Out        Subjective:   Chief Complaint: Boyd Veras is an 70 y.o. male here for an Annual Wellness visit.   HPI: Patient is here today for a wellness visit.  He was a former patient of mine esteemed colleague Dr. Alegria at the [facility].  With his care home he is planning to transition his care to us out here at this clinic.    He has a history of coronary artery disease and has had a couple of stents put in over the last decade or so.  He is not on a beta-blocker and he has stopped his atorvastatin for reasons that are not quite clear to me.  He states that he does not like how he feels on it.  He does not describe specific myalgias or muscle pains but feels that it made him a bit tired but he is not sure if those symptoms are real or if it was just in his head.  He has not had his cholesterol checked for about a year.    He has gout and it is something that is pretty mild and he takes 100 mg of allopurinol about 4 days out of the week.  This seems to do well as he has not had any gout flares in quite some time.    He has a cyst that he would like to show me in his low back.  His wife is concerned about it and wants to know if it can be removed.  He does not really give him any pain when it does not drain it is not red or hot.        Review of Systems:    Please see above.  The rest of the  review of systems are negative for all systems.    Patient Care Team:  Provider, No Primary Care as PCP - Dannie Arrignton MD as Assigned PCP     Patient Active Problem List   Diagnosis     Gout     Benign Skin Neoplasm     Hyperlipidemia     NSTEMI (non-ST elevated myocardial infarction) (H)     Coronary artery disease     S/P coronary artery stent placement     Sinus bradycardia     Rotator cuff tear     Family history of coronary artery disease     Rupture of biceps tendon     S/P right coronary artery (RCA) stent placement     Acromioclavicular joint arthritis     S/P drug eluting coronary stent placement     Past Medical History:   Diagnosis Date     Arthritis      Gout      Hyperlipidemia      Myocardial infarction (H)     2014-stented LAD; 2016-RCA Stent     Stented coronary artery Dec 2014    @[facility]; Dr. David Nicholson      No past surgical history on file.   Family History   Problem Relation Age of Onset     Heart disease Daughter       Social History     Socioeconomic History     Marital status:      Spouse name: Rose     Number of children: 6     Years of education: Not on file     Highest education level: Not on file   Occupational History     Occupation: finance     Employer: [employer]   Social Needs     Financial resource strain: Not on file     Food insecurity     Worry: Not on file     Inability: Not on file     Transportation needs     Medical: Not on file     Non-medical: Not on file   Tobacco Use     Smoking status: Never Smoker     Smokeless tobacco: Never Used   Substance and Sexual Activity     Alcohol use: Yes     Alcohol/week: 4.2 standard drinks     Types: 5 Standard drinks or equivalent per week     Comment: rare/infrequent     Drug use: No     Sexual activity: Not on file   Lifestyle     Physical activity     Days per week: Not on file     Minutes per session: Not on file     Stress: Not on file   Relationships     Social connections     Talks on phone: Not on file      "Gets together: Not on file     Attends Orthodox service: Not on file     Active member of club or organization: Not on file     Attends meetings of clubs or organizations: Not on file     Relationship status: Not on file     Intimate partner violence     Fear of current or ex partner: Not on file     Emotionally abused: Not on file     Physically abused: Not on file     Forced sexual activity: Not on file   Other Topics Concern     Not on file   Social History Narrative     (Rose) 7 kids/ works in finance/      Current Outpatient Medications   Medication Sig Dispense Refill     allopurinoL (ZYLOPRIM) 100 MG tablet TAKE 1 TABLET BY MOUTH EVERY DAY 90 tablet 3     ascorbic acid, vitamin C, (VITAMIN C) 500 MG tablet Take 500 mg by mouth.       aspirin 81 MG EC tablet Take 81 mg by mouth daily.       multivitamin with minerals tablet Take 1 tablet by mouth.       nitroglycerin (NITROSTAT) 0.4 MG SL tablet Place 0.4 mg under the tongue.       atorvastatin (LIPITOR) 80 MG tablet Take 80 mg by mouth.       No current facility-administered medications for this visit.       Objective:   Vital Signs:   Visit Vitals  /78 (Patient Site: Left Arm, Patient Position: Sitting, Cuff Size: Adult Regular)   Pulse 62   Ht 5' 10\" (1.778 m)   Wt 171 lb 4.8 oz (77.7 kg)   SpO2 98%   BMI 24.58 kg/m           VisionScreening:  No exam data present     PHYSICAL EXAM    General: Awake, Alert and Cooperative   Head: Normocephalic and Atraumatic   Eyes: PERRL, EOMI.   ENT: Normal pearly TMs bilaterally and Oropharynx clear   Neck: Supple and Thyroid without enlargement or nodules   Chest: Chest wall normal   Lungs: Clear to auscultation bilaterally   Heart:: Regular rate and rhythm and no murmurs.  No significant LE edema.   Abdomen: Soft, nontender, nondistended and no hepatosplenomegaly   Musculoskeletal: Moving all extremities and No pain in the extremities   Neuro: Alert and oriented times 3 and Grossly normal "   Skin: No rashes or lesions noted        Assessment Results 1/4/2021   Activities of Daily Living No help needed   Instrumental Activities of Daily Living No help needed   Get Up and Go Score Less than 12 seconds   Mini Cog Total Score 4   Some recent data might be hidden     A Mini-Cog score of 0-2 suggests the possibility of dementia, score of 3-5 suggests no dementia    Identified Health Risks:     He is at risk for falling and has been provided with information to reduce the risk of falling at home.  Patient's advanced directive was discussed and I am comfortable with the patient's wishes.

## 2021-06-14 NOTE — TELEPHONE ENCOUNTER
Who is calling:  Patient   Reason for Call:  Patient is looking to do labs/and to schedule a minor surgery, please contact patient for route of care.  Date of last appointment with primary care: NA  Okay to leave a detailed message: Yes

## 2021-06-14 NOTE — PROGRESS NOTES
"  Assessment & Plan     Sebaceous cyst    Pt is here for sebaceous cyst excision today.  It  is 1.5 cm in diameter.  It is getting more painful, and growing, and needs to be removed.    The physical exam is generally normal. Patient appears well, alert and oriented x 3, pleasant, cooperative. Vitals are as noted. Neck supple and free of adenopathy, or masses. No thyromegaly. ROX. Ears, throat are normal. Lungs are clear to auscultation. Heart sounds are normal, no murmurs, clicks, gallops or rubs. Abdomen is soft, no tenderness, masses or organomegaly.           Sebaceous Cyst Excision Procedure Note    Pre-operative Diagnosis: sebaceous cyst    Post-operative Diagnosis: same    Locations:lower back    Indications: pain, growing    Anesthesia: Lidocaine 1% without epinephrine      Procedure Details   History of allergy to iodine: no    Patient informed of the risks (including bleeding and infection) and benefits of the   procedure and Verbal informed consent obtained.    The lesion and surrounding area was given a sterile prep using betadyne and draped in the usual sterile fashion. An incision was made over the cyst, which was dissected free of the surrounding tissue and removed.  The cyst was filled with typical sebaceous material.  The wound was closed with 3-0 Ethilon using simple interrupted stitches. Antibiotic ointment and a sterile dressing applied.  The specimen was not sent for pathologic examination. The patient tolerated the procedure well.    EBL: 0 ml    Findings:  cyst    Condition:  Stable    Complications:  none.    Plan:  1. Instructed to keep the wound dry and covered for 24-48h and clean thereafter.  2. Warning signs of infection were reviewed.    3. Recommended that the patient use OTC analgesics as needed for pain.   4. Return for suture removal in 10 days.       [id number]}     BMI:   Estimated body mass index is 25.55 kg/m  as calculated from the following:    Height as of 1/4/21: 5' 10\" (1.778 " m).    Weight as of this encounter: 178 lb 1.6 oz (80.8 kg).         No follow-ups on file.    Dnanie Snider MD  [facility]

## 2021-06-14 NOTE — TELEPHONE ENCOUNTER
Left message to call back for: patient  Information to relay to patient:  Please find out more info about what pt is wanting to be seen for and schedule appropriately.

## 2021-06-16 PROBLEM — Z95.5 S/P DRUG ELUTING CORONARY STENT PLACEMENT: Chronic | Status: ACTIVE | Noted: 2018-02-26

## 2021-06-16 PROBLEM — Z95.5 S/P RIGHT CORONARY ARTERY (RCA) STENT PLACEMENT: Chronic | Status: ACTIVE | Noted: 2017-06-06

## 2021-06-16 NOTE — PROGRESS NOTES
Preoperative Exam    Scheduled Procedure: Right Shoulder repair  Surgery Date:  3/14/18  Surgery Location: [facility]  Fax # [phone]  Surgeon:  Dr.Pearce Davis; [facility]    Assessment/Plan:     Problem List Items Addressed This Visit     S/P right coronary artery (RCA) stent placement    S/P drug eluting coronary stent placement    S/P coronary artery stent placement    Rotator cuff tear    Coronary artery disease      Other Visit Diagnoses     Preop exam for internal medicine    -  Primary    Relevant Orders    Electrocardiogram Perform and Read (Completed)    Hyperlipemia        Relevant Orders    Comprehensive Metabolic Panel (Completed)    Lipid Cascade (Completed)    HM2(CBC w/o Differential) (Completed)    Gout        Relevant Orders    Uric Acid (Completed)            Surgical Procedure Risk: Intermediate (reported cardiac risk generally 1-5%)  Have you had prior anesthesia?: Yes  Have you or any family members had a previous anesthesia reaction:  No  Do you or any family members have a history of a clotting or bleeding disorder?: No  Cardiac Risk Assessment: increased, Hx of CAD/Stent Stable    Patient approved for surgery with general or local anesthesia.        Functional Status: Independent  Patient plans to recover at home with family.     Subjective:      Boyd Veras is a 67 y.o. male who presents for a preoperative consultation.  [place] has had chronic right shoulder problems with partial tear going back about 6 years or so and he managed well until he fell skating with his family during Chaitanya time.  The full rotator cuff tear and other problems with his right shoulder.  He scheduled for shoulder repair with Dr. Ryan Tamayo [facility] [place] location.  He has had a history of coronary artery disease going back to at least 2014 when he presented with an an STEMI and had LAD stents subsequently he had angiogram and July 2016 and had a right coronary stent  placed.  He has not had cardiac or related symptoms or concerns he suffers with his chronic shoulder pain he has marked decreased range of motion making it difficult for him to use his right arm.  He understands the rationale for surgery he understands increased risk  He sees Dr. Moses Burger at [facility] who is aware of the surgical plans apparently we will be sending Dr. Burger copy of his labs and EKG today.  Plan is to have him continue same medications up until the time of surgery except he will hold the Plavix 5 days prior to prevent bleeding complications he will also hold the aspirin all the other medications remain the same routine blood tests today are ordered and will be posted to this note    All other systems reviewed and are negative, other than those listed in the HPI.    Pertinent History  Do you have difficulty breathing or chest pain after walking up a flight of stairs: No  History of obstructive sleep apnea: No  Steroid use in the last 6 months: No  Frequent Aspirin/NSAID use: Yes:  Prior Blood Transfusion: No  Prior Blood Transfusion Reaction: No  If for some reason prior to, during or after the procedure, if it is medically indicated, would you be willing to have a blood transfusion?:  There is no transfusion refusal.    Current Outpatient Prescriptions   Medication Sig Dispense Refill     allopurinol (ZYLOPRIM) 100 MG tablet Take 1 tablet (100 mg total) by mouth daily. (to prevent gout) 90 tablet 3     ascorbic acid, vitamin C, (VITAMIN C) 500 MG tablet Take 500 mg by mouth.       aspirin 81 MG EC tablet Take 81 mg by mouth daily.       atorvastatin (LIPITOR) 80 MG tablet Take 80 mg by mouth.       multivitamin with minerals tablet Take 1 tablet by mouth.       nitroglycerin (NITROSTAT) 0.4 MG SL tablet Place 0.4 mg under the tongue.       clopidogrel (PLAVIX) 75 mg tablet Take 75 mg by mouth.       magnesium oxide 500 mg Tab Take by mouth.       No current facility-administered medications for  "this visit.         Allergies   Allergen Reactions     Isosorbide Mononitrate Headache       Patient Active Problem List   Diagnosis     Gout     Benign Skin Neoplasm     Hyperlipidemia     NSTEMI (non-ST elevated myocardial infarction)     Coronary artery disease     S/P coronary artery stent placement     Sinus bradycardia     Rotator cuff tear     Family history of coronary artery disease     Rupture of biceps tendon     S/P right coronary artery (RCA) stent placement     Acromioclavicular joint arthritis     S/P drug eluting coronary stent placement       Past Medical History:   Diagnosis Date     Arthritis      Gout      Hyperlipidemia      Myocardial infarction     2014-stented LAD; 2016-RCA Stent     Stented coronary artery Dec 2014    @[facility]; Dr. David Nicholson       No past surgical history on file.    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  Appears healthy  VS- see above  HEENT- neg   Neck- no adenopathy/thyromegaly/bruits  Chest- clear   Cor- reg no murmurs/gallops/ectopics  Extremities: no edema, good distal pulses  Neuro- Cr. NN-  intact, alert,   Abdomen- soft non tender, no masses; no " organomegaly  Skin- no suspicious lesions for malignancy  Lymph- no pathologic nodes in neck/axilla/groin  Musculoskeletal-marked decreased range of motion of the right shoulder difficult to move the elbow away from the side and he can lift his arm up over his head at all   -        23-FEB-2018 11:19:30 HE JOES/JOHNS/LISA/FELICIANO  Sinus bradycardia  Otherwise normal ECG  When compared with ECG of 24-MAY-2009 09:53,  No significant change was found  Confirmed by JORDAN GAO MD LOC:[id number]) on 2/24/2018 5:05:29 PM  Labs:  Recent Results (from the past 240 hour(s))   Electrocardiogram Perform and Read   Result Value Ref Range    SYSTOLIC BLOOD PRESSURE  mmHg    DIASTOLIC BLOOD PRESSURE  mmHg    VENTRICULAR RATE 52 BPM    ATRIAL RATE 52 BPM    P-R INTERVAL 184 ms    QRS DURATION 96 ms    Q-T INTERVAL 444 ms    QTC CALCULATION (BEZET) 412 ms    P Axis 57 degrees    R AXIS -7 degrees    T AXIS 9 degrees    MUSE DIAGNOSIS       Sinus bradycardia  Otherwise normal ECG  When compared with ECG of 24-MAY-2009 09:53,  No significant change was found  Confirmed by JORDAN GAO MD LOC:[id number]) on 2/24/2018 5:05:29 PM     Comprehensive Metabolic Panel   Result Value Ref Range    Sodium 139 136 - 145 mmol/L    Potassium 4.0 3.5 - 5.0 mmol/L    Chloride 105 98 - 107 mmol/L    CO2 25 22 - 31 mmol/L    Anion Gap, Calculation 9 5 - 18 mmol/L    Glucose 90 70 - 125 mg/dL    BUN 21 8 - 22 mg/dL    Creatinine 1.07 0.70 - 1.30 mg/dL    GFR MDRD Af Amer >60 >60 mL/min/1.73m2    GFR MDRD Non Af Amer >60 >60 mL/min/1.73m2    Bilirubin, Total 2.0 (H) 0.0 - 1.0 mg/dL    Calcium 9.4 8.5 - 10.5 mg/dL    Protein, Total 7.0 6.0 - 8.0 g/dL    Albumin 4.1 3.5 - 5.0 g/dL    Alkaline Phosphatase 83 45 - 120 U/L    AST 26 0 - 40 U/L    ALT 27 0 - 45 U/L   Lipid Cascade   Result Value Ref Range    Cholesterol 167 <=199 mg/dL    Triglycerides 71 <=149 mg/dL    HDL Cholesterol 63 >=40 mg/dL    LDL Calculated 90 <=129 mg/dL    Patient Fasting > 8hrs? Yes     HM2(CBC w/o Differential)   Result Value Ref Range    WBC 5.5 4.0 - 11.0 thou/uL    RBC 4.53 4.40 - 6.20 mill/uL    Hemoglobin 14.6 14.0 - 18.0 g/dL    Hematocrit 42.2 40.0 - 54.0 %    MCV 93 80 - 100 fL    MCH 32.2 27.0 - 34.0 pg    MCHC 34.6 32.0 - 36.0 g/dL    RDW 11.0 11.0 - 14.5 %    Platelets 206 140 - 440 thou/uL    MPV 7.0 7.0 - 10.0 fL   Uric Acid   Result Value Ref Range    Uric Acid 6.3 3.0 - 8.0 mg/dL       Immunization History   Administered Date(s) Administered     DT (pediatric) 01/01/1996     Td, Adult, Absorbed 06/06/2017     Td,adult,historic,unspecified 09/19/2006     Tdap 09/19/2006           Electronically signed by Del Curtis MD 02/26/18 10:55 AM

## 2021-06-17 NOTE — TELEPHONE ENCOUNTER
Patient has a lab only appointment on Monday 5/24 and there are no orders in his chart. States it is labs from his AW[facility] in January.  Please review and place future orders if appropriate.  Thanks!

## 2021-06-18 NOTE — PATIENT INSTRUCTIONS - HE
Patient Instructions by Dannie Snider MD at 1/4/2021  2:30 PM     Author: Dannie Snider MD Service: -- Author Type: Physician    Filed: 1/4/2021  2:51 PM Encounter Date: 1/4/2021 Status: Signed    : Dannie Snider MD (Physician)         Patient Education   Preventing Falls in the Home  As you get older, falls are more likely. Thats because your reaction time slows. Your muscles and joints may also get stiffer, making them less flexible. Illness, medications, and vision changes can also affect your balance. A fall could leave you unable to live on your own. To make your home safer, follow these tips:    Floors    Put nonskid pads under area rugs.    Remove throw rugs.    Replace worn floor coverings.    Tack carpets firmly to each step on carpeted stairs. Put nonskid strips on the edges of uncarpeted stairs.    Keep floors and stairs free of clutter and cords.    Arrange furniture so there are clear pathways.    Clean up any spills right away.    Bathrooms    Install grab bars in the tub or shower.    Apply nonskid strips or put a nonskid rubber mat in the tub or shower.    Sit on a bath chair to bathe.    Use bathmats with nonskid backing.    Lighting    Keep a flashlight in each room.    Put a nightlight along the pathway between the bedroom and the bathroom.    6053-2518 [employer]. [address] [place] [address]. All rights reserved. This information is not intended as a substitute for professional medical care. Always follow your healthcare professional's instructions.           Advance Directive  Patients advance directive was discussed and I am comfortable with the patients wishes.  Patient Education   Personalized Prevention Plan  You are due for the preventive services outlined below.  Your care team is available to assist you in scheduling these services.  If you have already completed any of these items, please share that information with your care team to update in your  medical record.  Health Maintenance   Topic Date Due   ? HEPATITIS C SCREENING  1950   ? COLORECTAL CANCER SCREENING  09/16/1967   ? ZOSTER VACCINES (1 of 2) 09/16/1999   ? Pneumococcal Vaccine: 65+ Years (1 of 1 - PPSV23) 09/16/2014   ? MEDICARE ANNUAL WELLNESS VISIT  01/04/2022   ? FALL RISK ASSESSMENT  01/04/2022   ? ADVANCE CARE PLANNING  06/06/2022   ? LIPID  02/23/2023   ? TD 18+ HE  06/06/2027   ? Pneumococcal Vaccine: Pediatrics (0 to 5 Years) and At-Risk Patients (6 to 64 Years)  Aged Out   ? HEPATITIS B VACCINES  Aged Out

## 2021-06-22 NOTE — PROGRESS NOTES
1. Gout          Medications Ordered   Medications     predniSONE (DELTASONE) 20 MG tablet     Sig: Take 20 mg by mouth 2 (two) times a day for 5 days.     Dispense:  10 tablet     Refill:  0        Plan: I did give him some prednisone that he has taken before and that has worked very well for his gout in the past.  He is only getting about one flare every other year, which is a vast improvement since he has been on the allopurinol.  He is quite happy with this treatment regimen, and we discussed today some of the gout symptoms and what brings up on what might of contributed to this flare.  We will follow-up with him as needed.    Subjective: 68-year-old male with a history of gout.  He is here with what he believes is a flareup of gout.  Is been a couple of years since he has had his last flare.  He is on allopurinol 100 mg daily and is working out very well to prevent flares.  However, over the last couple of days he has noticed pain in the right great toe, actually not in the MTP joint but more up into the IP joint of the great toe on the right side.  It is red and swollen and very warm and he pressure at all put on it.  He has no pain anywhere else on the right foot and none on the left side.  He has been using over-the-counter treatments which have been not very helpful.    Objective: Well-appearing male in no acute distress.  Vital signs as noted.  Heart regular rate rhythm chest clear to auscultation.  The right toe on the great toe shows redness and warmth and swelling very tender even just to the slightest touch.  MTP joint is not tender.

## 2021-07-03 NOTE — ADDENDUM NOTE
Addendum Note by Erickson Major MD at 10/16/2017  4:24 PM     Author: Erickson Major MD Service: -- Author Type: Physician    Filed: 10/16/2017  4:24 PM Encounter Date: 10/16/2017 Status: Signed    : Erickson Major MD (Physician)    Addended by: ERICKSON MAJOR on: 10/16/2017 04:24 PM        Modules accepted: Orders

## 2021-07-04 ENCOUNTER — HEALTH MAINTENANCE LETTER (OUTPATIENT)
Age: 71
End: 2021-07-04

## 2021-08-23 ENCOUNTER — TRANSFERRED RECORDS (OUTPATIENT)
Dept: HEALTH INFORMATION MANAGEMENT | Facility: CLINIC | Age: 71
End: 2021-08-23

## 2021-08-23 LAB — EJECTION FRACTION: NORMAL %

## 2021-10-24 ENCOUNTER — HEALTH MAINTENANCE LETTER (OUTPATIENT)
Age: 71
End: 2021-10-24

## 2022-01-24 NOTE — TELEPHONE ENCOUNTER
RN cannot approve Refill Request    RN can NOT refill this medication No established PCP. Last office visit: Visit date not found Last Physical: Visit date not found Last MTM visit: Visit date not found Last visit same specialty: 12/11/2018 Dannie Snider MD.  Next visit within 3 mo: Visit date not found  Next physical within 3 mo: Visit date not found      Ana Maria Austin, [facility] Triage/Med Refill 6/14/2020    Requested Prescriptions   Pending Prescriptions Disp Refills     allopurinoL (ZYLOPRIM) 100 MG tablet [Pharmacy Med Name: ALLOPURINOL 100 MG TABLET] 90 tablet 3     Sig: TAKE 1 TABLET BY MOUTH EVERY DAY       Allopurinol/Febuxostat Refill Protocol  Failed - 6/13/2020  9:50 AM        Failed - LFT or AST or ALT in last 12 months     Albumin   Date Value Ref Range Status   02/23/2018 4.1 3.5 - 5.0 g/dL Final     Bilirubin, Total   Date Value Ref Range Status   02/23/2018 2.0 (H) 0.0 - 1.0 mg/dL Final     Alkaline Phosphatase   Date Value Ref Range Status   02/23/2018 83 45 - 120 U/L Final     AST   Date Value Ref Range Status   02/23/2018 26 0 - 40 U/L Final     ALT   Date Value Ref Range Status   02/23/2018 27 0 - 45 U/L Final     Protein, Total   Date Value Ref Range Status   02/23/2018 7.0 6.0 - 8.0 g/dL Final                Failed - Visit with PCP or prescribing provider visit in past 12 months     Last office visit with prescriber/PCP: Visit date not found OR same dept: Visit date not found OR same specialty: 12/11/2018 Dannie Snider MD  Last physical: Visit date not found Last MTM visit: Visit date not found   Next visit within 3 mo: Visit date not found  Next physical within 3 mo: Visit date not found  Prescriber OR PCP: Markel Hanna MD  Last diagnosis associated with med order: 1. Gout  - allopurinoL (ZYLOPRIM) 100 MG tablet [Pharmacy Med Name: ALLOPURINOL 100 MG TABLET]; TAKE 1 TABLET BY MOUTH EVERY DAY  Dispense: 90 tablet; Refill: 3    If protocol passes may refill for 12 months if  within 3 months of last provider visit (or a total of 15 months).                    Yes

## 2022-02-13 ENCOUNTER — HEALTH MAINTENANCE LETTER (OUTPATIENT)
Age: 72
End: 2022-02-13

## 2022-05-13 ENCOUNTER — NURSE TRIAGE (OUTPATIENT)
Dept: NURSING | Facility: CLINIC | Age: 72
End: 2022-05-13
Payer: COMMERCIAL

## 2022-05-13 NOTE — TELEPHONE ENCOUNTER
"Triage call:   Tick bite   Embedded overnight  States that the tick was hard to get out   Area is a little sore today  Uncertain about size of the tick- uncertain if it was a deer tick or wood tick  Under his left arm   Last tetanus was 2017  States that the bite area itself is pretty red  States he used a needle and tweezers to get the tick out- states he is not confident it was all removed  States he was not confident on the cleanliness of the needle either. States that he \"did surgery on himself\"    Writer advise that he should be seen today in the clinic- reviewed additional care advice with patient and he verbalizes understanding. Assisted in transferring to scheduling. No appointments that worked for patient- will go to [facility] today to be seen.     Alejandra Nava RN BSN 5/13/2022 12:10 PM    COVID 19 Nurse Triage Plan/Patient Instructions    Please be aware that novel coronavirus (COVID-19) may be circulating in the community. If you develop symptoms such as fever, cough, or SOB or if you have concerns about the presence of another infection including coronavirus (COVID-19), please contact your health care provider or visit [email or web address].     Disposition/Instructions    In-Person Visit with provider recommended. Reference Visit Selection Guide.    Thank you for taking steps to prevent the spread of this virus.  o Limit your contact with others.  o Wear a simple mask to cover your cough.  o Wash your hands well and often.    Resources    [facility]: About COVID-19: [email or web address]    CDC: What to Do If You're Sick: www.cdc.gov/coronavirus/2019-ncov/about/steps-when-sick.html    CDC: Ending Home Isolation: www.cdc.gov/coronavirus/2019-ncov/hcp/disposition-in-home-patients.html     CDC: Caring for Someone: www.cdc.gov/coronavirus/2019-ncov/if-you-are-sick/care-for-someone.html     [facility]: Interim Guidance for Hospital Discharge to Home: " www.health.[facility].[place]/diseases/coronavirus/hcp/hospdischarge.pdf    [facility] clinical trials (COVID-19 research studies): [email or web address]     Below are the COVID-19 hotlines at the [place] Department of Health ([facility]). Interpreters are available.   o For health questions: Call 834-633-5726 or 1-941.875.8684 (7 a.m. to 7 p.m.)  o For questions about schools and childcare: Call 780-926-1849 or 1-216.256.1292 (7 a.m. to 7 p.m.)                            Reason for Disposition    Can't remove tick's head that was broken off in the skin (after using Care Advice)    Additional Information    Negative: Not a tick bite    Negative: Patient sounds very sick or weak to the triager    Negative: Fever or severe headache occurs, 2 to 14 days following the bite    Negative: Widespread rash occurs, 2 to 14 days following the bite    Negative: Can't remove live tick (after using Care Advice)    Protocols used: TICK BITE-A-[place]

## 2022-05-31 ENCOUNTER — OFFICE VISIT (OUTPATIENT)
Dept: FAMILY MEDICINE | Facility: CLINIC | Age: 72
End: 2022-05-31
Payer: COMMERCIAL

## 2022-05-31 VITALS
HEART RATE: 55 BPM | SYSTOLIC BLOOD PRESSURE: 128 MMHG | HEIGHT: 70 IN | OXYGEN SATURATION: 99 % | WEIGHT: 169 LBS | BODY MASS INDEX: 24.2 KG/M2 | DIASTOLIC BLOOD PRESSURE: 82 MMHG

## 2022-05-31 DIAGNOSIS — M1A.09X0 IDIOPATHIC CHRONIC GOUT OF MULTIPLE SITES WITHOUT TOPHUS: ICD-10-CM

## 2022-05-31 DIAGNOSIS — Z12.5 SCREENING FOR MALIGNANT NEOPLASM OF PROSTATE: ICD-10-CM

## 2022-05-31 DIAGNOSIS — Z12.11 SPECIAL SCREENING FOR MALIGNANT NEOPLASMS, COLON: ICD-10-CM

## 2022-05-31 DIAGNOSIS — Z13.1 SCREENING FOR DIABETES MELLITUS: ICD-10-CM

## 2022-05-31 DIAGNOSIS — R68.82 LOW LIBIDO: ICD-10-CM

## 2022-05-31 DIAGNOSIS — Z00.00 ENCOUNTER FOR MEDICARE ANNUAL WELLNESS EXAM: Primary | ICD-10-CM

## 2022-05-31 DIAGNOSIS — Z13.220 SCREENING, LIPID: ICD-10-CM

## 2022-05-31 PROBLEM — M10.9 GOUT: Status: ACTIVE | Noted: 2020-07-20

## 2022-05-31 LAB
ALBUMIN SERPL-MCNC: 4 G/DL (ref 3.5–5)
ALP SERPL-CCNC: 82 U/L (ref 45–120)
ALT SERPL W P-5'-P-CCNC: 15 U/L (ref 0–45)
ANION GAP SERPL CALCULATED.3IONS-SCNC: 11 MMOL/L (ref 5–18)
AST SERPL W P-5'-P-CCNC: 19 U/L (ref 0–40)
BILIRUB SERPL-MCNC: 2.1 MG/DL (ref 0–1)
BUN SERPL-MCNC: 20 MG/DL (ref 8–28)
CALCIUM SERPL-MCNC: 9.2 MG/DL (ref 8.5–10.5)
CHLORIDE BLD-SCNC: 104 MMOL/L (ref 98–107)
CHOLEST SERPL-MCNC: 276 MG/DL
CO2 SERPL-SCNC: 24 MMOL/L (ref 22–31)
CREAT SERPL-MCNC: 1.15 MG/DL (ref 0.7–1.3)
FASTING STATUS PATIENT QL REPORTED: ABNORMAL
GFR SERPL CREATININE-BSD FRML MDRD: 68 ML/MIN/1.73M2
GLUCOSE BLD-MCNC: 79 MG/DL (ref 70–125)
HDLC SERPL-MCNC: 62 MG/DL
LDLC SERPL CALC-MCNC: 191 MG/DL
POTASSIUM BLD-SCNC: 4.4 MMOL/L (ref 3.5–5)
PROT SERPL-MCNC: 6.8 G/DL (ref 6–8)
PSA SERPL-MCNC: 0.59 UG/L (ref 0–6.5)
SODIUM SERPL-SCNC: 139 MMOL/L (ref 136–145)
TRIGL SERPL-MCNC: 117 MG/DL
URATE SERPL-MCNC: 7.3 MG/DL (ref 3–8)

## 2022-05-31 PROCEDURE — G0103 PSA SCREENING: HCPCS | Performed by: FAMILY MEDICINE

## 2022-05-31 PROCEDURE — 80061 LIPID PANEL: CPT | Performed by: FAMILY MEDICINE

## 2022-05-31 PROCEDURE — 84403 ASSAY OF TOTAL TESTOSTERONE: CPT | Performed by: FAMILY MEDICINE

## 2022-05-31 PROCEDURE — 36415 COLL VENOUS BLD VENIPUNCTURE: CPT | Performed by: FAMILY MEDICINE

## 2022-05-31 PROCEDURE — G0439 PPPS, SUBSEQ VISIT: HCPCS | Performed by: FAMILY MEDICINE

## 2022-05-31 PROCEDURE — 80053 COMPREHEN METABOLIC PANEL: CPT | Performed by: FAMILY MEDICINE

## 2022-05-31 PROCEDURE — 84270 ASSAY OF SEX HORMONE GLOBUL: CPT | Performed by: FAMILY MEDICINE

## 2022-05-31 PROCEDURE — 84550 ASSAY OF BLOOD/URIC ACID: CPT | Performed by: FAMILY MEDICINE

## 2022-05-31 ASSESSMENT — ENCOUNTER SYMPTOMS
MYALGIAS: 0
PALPITATIONS: 0
CONSTIPATION: 0
FEVER: 0
HEMATOCHEZIA: 0
CHILLS: 0
JOINT SWELLING: 0
SHORTNESS OF BREATH: 0
EYE PAIN: 0
NERVOUS/ANXIOUS: 0
SORE THROAT: 0
DYSURIA: 0
DIARRHEA: 0
ARTHRALGIAS: 0
DIZZINESS: 0
HEARTBURN: 0
NAUSEA: 0
COUGH: 0
PARESTHESIAS: 0
WEAKNESS: 0
ABDOMINAL PAIN: 0
HEADACHES: 0
FREQUENCY: 0
HEMATURIA: 0

## 2022-05-31 ASSESSMENT — ACTIVITIES OF DAILY LIVING (ADL): CURRENT_FUNCTION: NO ASSISTANCE NEEDED

## 2022-05-31 NOTE — PATIENT INSTRUCTIONS
Patient Education   Personalized Prevention Plan  You are due for the preventive services outlined below.  Your care team is available to assist you in scheduling these services.  If you have already completed any of these items, please share that information with your care team to update in your medical record.  Health Maintenance Due   Topic Date Due     ANNUAL REVIEW OF HM ORDERS  Never done     Colorectal Cancer Screening  Never done     Hepatitis C Screening  Never done     Zoster (Shingles) Vaccine (1 of 2) Never done     Pneumococcal Vaccine (1 - PCV) Never done     AORTIC ANEURYSM SCREENING (SYSTEM ASSIGNED)  Never done     COVID-19 Vaccine (2 - Booster for Gila series) 12/15/2021
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
## 2022-06-01 LAB — SHBG SERPL-SCNC: 71 NMOL/L (ref 11–80)

## 2022-06-02 LAB
TESTOST FREE SERPL-MCNC: 8.88 NG/DL
TESTOST SERPL-MCNC: 690 NG/DL (ref 240–950)

## 2022-06-06 ENCOUNTER — TELEPHONE (OUTPATIENT)
Dept: FAMILY MEDICINE | Facility: CLINIC | Age: 72
End: 2022-06-06
Payer: COMMERCIAL
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
## 2022-06-06 NOTE — TELEPHONE ENCOUNTER
Pt calling and said he notice he had a deertick and was wondering is PCP is able to presribe any medication for that as pt is leaving on a trip 6/11 and just wanted something on hand in case on an emergency resulting from the deertick.

## 2022-06-07 RX ORDER — DOXYCYCLINE 100 MG/1
100 CAPSULE ORAL 2 TIMES DAILY
Qty: 20 CAPSULE | Refills: 0 | Status: SHIPPED | OUTPATIENT
Start: 2022-06-07 | End: 2022-06-17

## 2022-10-15 ENCOUNTER — HEALTH MAINTENANCE LETTER (OUTPATIENT)
Age: 72
End: 2022-10-15

## 2022-12-08 LAB — NONINV COLON CA DNA+OCC BLD SCRN STL QL: NEGATIVE

## 2022-12-26 ENCOUNTER — TELEPHONE (OUTPATIENT)
Dept: INTERNAL MEDICINE | Facility: CLINIC | Age: 72
End: 2022-12-26

## 2022-12-26 NOTE — TELEPHONE ENCOUNTER
12/26/2022@2:21  Called PT; no answer, left vm for PT to contact [facility] to schedule physical and pre-op per DR. Webster

## 2022-12-26 NOTE — TELEPHONE ENCOUNTER
----- Message from Mitchel Webster MD sent at 12/26/2022 12:58 PM CST -----  Please call and help schedule for preop and establish care physical

## 2023-01-03 ENCOUNTER — OFFICE VISIT (OUTPATIENT)
Dept: INTERNAL MEDICINE | Facility: CLINIC | Age: 73
End: 2023-01-03
Payer: COMMERCIAL

## 2023-01-03 VITALS
TEMPERATURE: 97.8 F | DIASTOLIC BLOOD PRESSURE: 74 MMHG | RESPIRATION RATE: 14 BRPM | SYSTOLIC BLOOD PRESSURE: 116 MMHG | WEIGHT: 172.1 LBS | OXYGEN SATURATION: 98 % | HEART RATE: 57 BPM | BODY MASS INDEX: 24.64 KG/M2 | HEIGHT: 70 IN

## 2023-01-03 DIAGNOSIS — I25.10 CORONARY ARTERY DISEASE INVOLVING NATIVE CORONARY ARTERY OF NATIVE HEART WITHOUT ANGINA PECTORIS: ICD-10-CM

## 2023-01-03 DIAGNOSIS — D64.9 NORMOCYTIC ANEMIA: ICD-10-CM

## 2023-01-03 DIAGNOSIS — S06.9X9D TRAUMATIC BRAIN INJURY WITH LOSS OF CONSCIOUSNESS, SUBSEQUENT ENCOUNTER: ICD-10-CM

## 2023-01-03 DIAGNOSIS — M1A.09X0 IDIOPATHIC CHRONIC GOUT OF MULTIPLE SITES WITHOUT TOPHUS: ICD-10-CM

## 2023-01-03 DIAGNOSIS — S83.511D RUPTURE OF ANTERIOR CRUCIATE LIGAMENT OF RIGHT KNEE, SUBSEQUENT ENCOUNTER: ICD-10-CM

## 2023-01-03 DIAGNOSIS — S22.41XD CLOSED FRACTURE OF MULTIPLE RIBS OF RIGHT SIDE WITH ROUTINE HEALING, SUBSEQUENT ENCOUNTER: ICD-10-CM

## 2023-01-03 DIAGNOSIS — Q25.43 AORTIC ROOT ANEURYSM: ICD-10-CM

## 2023-01-03 DIAGNOSIS — S83.411D COMPLETE TEAR OF MEDIAL COLLATERAL LIGAMENT OF RIGHT KNEE, SUBSEQUENT ENCOUNTER: ICD-10-CM

## 2023-01-03 DIAGNOSIS — Z01.818 PREOPERATIVE EXAMINATION: Primary | ICD-10-CM

## 2023-01-03 DIAGNOSIS — E78.2 MIXED HYPERLIPIDEMIA: ICD-10-CM
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
## 2023-01-03 PROBLEM — S09.90XA INJURY OF HEAD: Status: ACTIVE | Noted: 2022-11-21

## 2023-01-03 PROBLEM — Z95.5 S/P RIGHT CORONARY ARTERY (RCA) STENT PLACEMENT: Chronic | Status: RESOLVED | Noted: 2017-06-06 | Resolved: 2023-01-03

## 2023-01-03 PROBLEM — R26.9 ABNORMALITY OF GAIT AND MOBILITY: Status: RESOLVED | Noted: 2022-11-29 | Resolved: 2023-01-03

## 2023-01-03 PROBLEM — S06.9XAA TBI (TRAUMATIC BRAIN INJURY) (H): Status: ACTIVE | Noted: 2023-01-03

## 2023-01-03 PROBLEM — S09.90XA INJURY OF HEAD: Status: RESOLVED | Noted: 2022-11-21 | Resolved: 2023-01-03

## 2023-01-03 PROBLEM — R41.841 COGNITIVE COMMUNICATION DEFICIT: Status: RESOLVED | Noted: 2022-11-21 | Resolved: 2023-01-03

## 2023-01-03 PROBLEM — Z95.5 S/P DRUG ELUTING CORONARY STENT PLACEMENT: Chronic | Status: RESOLVED | Noted: 2018-02-26 | Resolved: 2023-01-03

## 2023-01-03 PROBLEM — R26.9 ABNORMALITY OF GAIT AND MOBILITY: Status: ACTIVE | Noted: 2022-11-29

## 2023-01-03 PROBLEM — M10.9 GOUT: Status: RESOLVED | Noted: 2020-07-20 | Resolved: 2023-01-03

## 2023-01-03 PROBLEM — R41.841 COGNITIVE COMMUNICATION DEFICIT: Status: ACTIVE | Noted: 2022-11-21
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
## 2023-01-03 LAB
ALBUMIN SERPL BCG-MCNC: 4.4 G/DL (ref 3.5–5.2)
ALBUMIN UR-MCNC: NEGATIVE MG/DL
ALP SERPL-CCNC: 95 U/L (ref 40–129)
ALT SERPL W P-5'-P-CCNC: 19 U/L (ref 10–50)
ANION GAP SERPL CALCULATED.3IONS-SCNC: 12 MMOL/L (ref 7–15)
APPEARANCE UR: CLEAR
AST SERPL W P-5'-P-CCNC: 26 U/L (ref 10–50)
BASOPHILS # BLD AUTO: 0 10E3/UL (ref 0–0.2)
BASOPHILS NFR BLD AUTO: 0 %
BILIRUB SERPL-MCNC: 0.7 MG/DL
BILIRUB UR QL STRIP: NEGATIVE
BUN SERPL-MCNC: 19.2 MG/DL (ref 8–23)
CALCIUM SERPL-MCNC: 9.7 MG/DL (ref 8.8–10.2)
CHLORIDE SERPL-SCNC: 103 MMOL/L (ref 98–107)
CHOLEST SERPL-MCNC: 311 MG/DL
COLOR UR AUTO: YELLOW
CREAT SERPL-MCNC: 1.03 MG/DL (ref 0.67–1.17)
DEPRECATED HCO3 PLAS-SCNC: 24 MMOL/L (ref 22–29)
EOSINOPHIL # BLD AUTO: 0.1 10E3/UL (ref 0–0.7)
EOSINOPHIL NFR BLD AUTO: 2 %
ERYTHROCYTE [DISTWIDTH] IN BLOOD BY AUTOMATED COUNT: 11.7 % (ref 10–15)
FERRITIN SERPL-MCNC: 51 NG/ML (ref 31–409)
FOLATE SERPL-MCNC: 33 NG/ML (ref 4.6–34.8)
GFR SERPL CREATININE-BSD FRML MDRD: 77 ML/MIN/1.73M2
GLUCOSE SERPL-MCNC: 89 MG/DL (ref 70–99)
GLUCOSE UR STRIP-MCNC: NEGATIVE MG/DL
HCT VFR BLD AUTO: 41 % (ref 40–53)
HDLC SERPL-MCNC: 70 MG/DL
HGB BLD-MCNC: 13.9 G/DL (ref 13.3–17.7)
HGB UR QL STRIP: NEGATIVE
IMM GRANULOCYTES # BLD: 0 10E3/UL
IMM GRANULOCYTES NFR BLD: 0 %
KETONES UR STRIP-MCNC: NEGATIVE MG/DL
LDLC SERPL CALC-MCNC: 224 MG/DL
LEUKOCYTE ESTERASE UR QL STRIP: NEGATIVE
LYMPHOCYTES # BLD AUTO: 2.4 10E3/UL (ref 0.8–5.3)
LYMPHOCYTES NFR BLD AUTO: 41 %
MCH RBC QN AUTO: 30.5 PG (ref 26.5–33)
MCHC RBC AUTO-ENTMCNC: 33.9 G/DL (ref 31.5–36.5)
MCV RBC AUTO: 90 FL (ref 78–100)
MONOCYTES # BLD AUTO: 0.5 10E3/UL (ref 0–1.3)
MONOCYTES NFR BLD AUTO: 8 %
NEUTROPHILS # BLD AUTO: 2.8 10E3/UL (ref 1.6–8.3)
NEUTROPHILS NFR BLD AUTO: 49 %
NITRATE UR QL: NEGATIVE
NONHDLC SERPL-MCNC: 241 MG/DL
PH UR STRIP: 5.5 [PH] (ref 5–8)
PLATELET # BLD AUTO: 209 10E3/UL (ref 150–450)
POTASSIUM SERPL-SCNC: 5.3 MMOL/L (ref 3.4–5.3)
PROT SERPL-MCNC: 7.2 G/DL (ref 6.4–8.3)
RBC # BLD AUTO: 4.55 10E6/UL (ref 4.4–5.9)
SODIUM SERPL-SCNC: 139 MMOL/L (ref 136–145)
SP GR UR STRIP: 1.01 (ref 1–1.03)
TRIGL SERPL-MCNC: 86 MG/DL
TSH SERPL DL<=0.005 MIU/L-ACNC: 2.95 UIU/ML (ref 0.3–4.2)
UROBILINOGEN UR STRIP-ACNC: 0.2 E.U./DL
VIT B12 SERPL-MCNC: 587 PG/ML (ref 232–1245)
WBC # BLD AUTO: 5.8 10E3/UL (ref 4–11)

## 2023-01-03 PROCEDURE — 82607 VITAMIN B-12: CPT | Performed by: INTERNAL MEDICINE

## 2023-01-03 PROCEDURE — 81003 URINALYSIS AUTO W/O SCOPE: CPT | Performed by: INTERNAL MEDICINE

## 2023-01-03 PROCEDURE — 99215 OFFICE O/P EST HI 40 MIN: CPT | Performed by: INTERNAL MEDICINE

## 2023-01-03 PROCEDURE — 36415 COLL VENOUS BLD VENIPUNCTURE: CPT | Performed by: INTERNAL MEDICINE

## 2023-01-03 PROCEDURE — 82728 ASSAY OF FERRITIN: CPT | Performed by: INTERNAL MEDICINE

## 2023-01-03 PROCEDURE — 80061 LIPID PANEL: CPT | Performed by: INTERNAL MEDICINE

## 2023-01-03 PROCEDURE — 82746 ASSAY OF FOLIC ACID SERUM: CPT | Performed by: INTERNAL MEDICINE

## 2023-01-03 PROCEDURE — 80050 GENERAL HEALTH PANEL: CPT | Performed by: INTERNAL MEDICINE

## 2023-01-03 PROCEDURE — 83010 ASSAY OF HAPTOGLOBIN QUANT: CPT | Performed by: INTERNAL MEDICINE

## 2023-01-03 RX ORDER — ROSUVASTATIN CALCIUM 20 MG/1
20 TABLET, COATED ORAL DAILY
Start: 2023-01-03 | End: 2024-08-01

## 2023-01-03 ASSESSMENT — PAIN SCALES - GENERAL: PAINLEVEL: NO PAIN (0)

## 2023-01-03 NOTE — PROGRESS NOTES
Preoperative Consultation   Boyd Veras   72 year old  male    Date of visit: 1/3/2023  Physician: Mitchel Webster MD    This is a preoperative consultation requested by Dr. Cooley in preparation for right knee arthroscopy with ACL reconstruction with possible MCL reconstruction on 1/18/2023 at [facility], fax [phone].       Assessment and Plan   Boyd Veras was seen in preoperative consultation in preparation for right knee arthroscopy with ACL reconstruction and possible MCL reconstruction.  This is a low risk surgery and the patient has no increased risk for major cardiac complications.  Please note he will hold aspirin 1 week prior to surgery.  He has no cardiopulmonary contraindication for surgery may proceed without further cardiopulmonary testing.  Please note that there is some subtle processing delay which may be related to his traumatic brain injury and should be monitored/could be exacerbated with anesthesia.    In regards to his cardiac risk factors, I have strongly recommended he restart rosuvastatin and seems mildly open to this.  I have encouraged him to schedule a physical exam with me at his convenience.    1. Preoperative examination    2. Rupture of anterior cruciate ligament of right knee, subsequent encounter    3. Complete tear of medial collateral ligament of right knee, subsequent encounter    4. Mixed hyperlipidemia    5. Normocytic anemia    6. Idiopathic chronic gout of multiple sites without tophus    7. CAD, no MI, LYNDSEY LADx2 2014, RCA 2016    8. Traumatic brain injury with loss of consciousness, subsequent encounter    9. Closed fracture of multiple ribs of right side with routine healing, subsequent encounter    10. 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  One grandchild.        Past Medical History   Patient Active  Problem List   Diagnosis     Idiopathic chronic gout of multiple sites without tophus     CAD, no MI, LYNDSEY LADx2 2014, RCA 2016     Mixed hyperlipidemia     TBI (traumatic brain injury)     Aortic root aneurysm       Past Surgical History  He has a past surgical history that includes Arthroscopy shoulder (Right).     History of Present Illness   This 72 year old man comes in to establish care and for preoperative examination.  He had a tree fall on him while he was walking in the woods and suffered numerous injuries including a traumatic brain injury with laceration of the scalp requiring stapling, rib injuries and a right knee injury.  Overall he seems to be improving.  Cognitively he is feeling better.  His rib pain seems improved and is controlled with acetaminophen.  He now has plans to have surgery.  He overall is fairly healthy.  He has a history of coronary artery disease with drug-eluting stent placement to the LAD artery in 2014 and subsequent RCA stenting in 2016.  He is on aspirin but not on a statin although 1 has been prescribed.  It is unclear his hesitation with taking a statin but does not appear that he had side effects with this in the past.  He has had echocardiogram showing normal cardiac function.  He does have slight enlargement of his aortic root to 4.2 cm checked in 2021 which is increased slightly from his 2014.  He has no current chest pain apart from where he injured his ribs.  No breathing difficulty.  No infectious symptoms    Recent antiplatelet use: yes Aspirin  Personal or family history of bleeding or clotting disorders: no  Steroid use in the past year: no  Personal or family history of difficulty with anesthesia: no  Current cardiopulmonary symptoms: no  No known obstructive sleep apnea    Review of Systems: A comprehensive review of systems was negative except as noted.     Medications and Allergies   Current Outpatient Medications   Medication Sig Dispense Refill     allopurinol  "(ZYLOPRIM) 100 MG tablet TAKE 1 TABLET BY MOUTH EVERY DAY 90 tablet 3     ascorbic acid, vitamin C, (VITAMIN C) 500 MG tablet [ASCORBIC ACID, VITAMIN C, (VITAMIN C) 500 MG TABLET] Take 500 mg by mouth.       aspirin 81 MG EC tablet [ASPIRIN 81 MG EC TABLET] Take 81 mg by mouth daily.       multivitamin with minerals tablet [MULTIVITAMIN WITH MINERALS TABLET] Take 1 tablet by mouth.       nitroglycerin (NITROSTAT) 0.4 MG SL tablet [NITROGLYCERIN (NITROSTAT) 0.4 MG SL TABLET] Place 0.4 mg under the tongue.       rosuvastatin (CRESTOR) 20 MG tablet Take 1 tablet (20 mg) by mouth daily       Allergies   Allergen Reactions     Isosorbide Mononitrate [Isosorbide] Headache        Family and Social History   Family History   Problem Relation Age of Onset     Dementia Mother      Cancer Father         mesothelioma     Heart Disease Sister      Cancer Sister      No Known Problems Sister      Coronary Artery Disease Brother      No Known Problems Brother      No Known Problems Brother      No Known Problems Daughter      No Known Problems Daughter      No Known Problems Daughter      No Known Problems Daughter      No Known Problems Daughter      No Known Problems Son         Social History     Tobacco Use     Smoking status: Never     Smokeless tobacco: Never   Vaping Use     Vaping Use: Never used   Substance Use Topics     Alcohol use: Yes     Alcohol/week: 5.0 standard drinks     Comment: 3-4 / week     Drug use: No        Physical Exam   General Appearance:   No acute distress    /74 (BP Location: Left arm, Patient Position: Sitting, Cuff Size: Adult Large)   Pulse 57   Temp 97.8  F (36.6  C)   Resp 14   Ht 1.778 m (5' 10\")   Wt 78.1 kg (172 lb 1.6 oz)   SpO2 98%   BMI 24.69 kg/m      EYES: Eyelids, conjunctiva, and sclera were normal. Pupils were normal. Cornea, iris, and lens were normal bilaterally.  HEAD, EARS, NOSE, MOUTH, AND THROAT: Head and face were normal. Hearing was normal to voice and the ears " were normal to external exam.   NECK: Neck appearance was normal. There were no neck masses and the thyroid was not enlarged.  RESPIRATORY: Breathing pattern was normal and the chest moved symmetrically.  Percussion/auscultatory percussion was normal.  Lung sounds were normal and there were no abnormal sounds.  CARDIOVASCULAR: Heart rate and rhythm were normal.  S1 and S2 were normal and there were no extra sounds or murmurs. Peripheral pulses in arms and legs were normal.  Jugular venous pressure was normal.  There was no peripheral edema.  GASTROINTESTINAL: The abdomen was normal in contour.  Bowel sounds were present.  Percussion detected no organ enlargement or tenderness.  Palpation detected no tenderness, mass, or enlarged organs.   MUSCULOSKELETAL: Skeletal configuration was normal and muscle mass was normal for age. Joint appearance was overall normal.  He has a brace on the right knee  LYMPHATIC: There were no enlarged nodes.  SKIN/HAIR/NAILS: Skin color was normal.  There were no skin lesions.  Hair and nails were normal.  NEUROLOGIC: The patient was alert and oriented to person, place, time, and circumstance. Speech was normal. Cranial nerves were normal. Motor strength was normal for age. The patient was normally coordinated.  PSYCHIATRIC:  Mood and affect were normal and the patient had normal recent and remote memory. The patient's judgment and insight were normal.       Additional Tests   Laboratory: He was anemic while hospitalized at [facility], we will recheck a CBC and some additional studies.  His CMP has looked okay and this will be repeated.  He has elevated lipids which we reviewed.    Radiology: Extensive review with radiology imaging and cardiac imaging    Electrocardiogram: EKG on 11/7/2022 showed normal sinus rhythm.  I do not have the EKG available but do have the report from [facility]    Total time over 40 minutes including chart review, history, examination, counseling and  documentation with     Mitchel Webster MD  Internal Medicine  Contact me at None   Breath Sounds equal & clear to percussion & auscultation, no accessory muscle use

## 2023-01-04 LAB — HAPTOGLOB SERPL-MCNC: 167 MG/DL (ref 32–197)

## 2023-06-06 ENCOUNTER — NURSE TRIAGE (OUTPATIENT)
Dept: NURSING | Facility: CLINIC | Age: 73
End: 2023-06-06

## 2023-06-06 ENCOUNTER — OFFICE VISIT (OUTPATIENT)
Dept: INTERNAL MEDICINE | Facility: CLINIC | Age: 73
End: 2023-06-06
Payer: COMMERCIAL

## 2023-06-06 VITALS
TEMPERATURE: 97 F | OXYGEN SATURATION: 98 % | SYSTOLIC BLOOD PRESSURE: 128 MMHG | HEART RATE: 60 BPM | DIASTOLIC BLOOD PRESSURE: 79 MMHG | RESPIRATION RATE: 16 BRPM | BODY MASS INDEX: 24.07 KG/M2 | HEIGHT: 70 IN | WEIGHT: 168.1 LBS

## 2023-06-06 DIAGNOSIS — S22.070D COMPRESSION FRACTURE OF T10 VERTEBRA WITH ROUTINE HEALING, SUBSEQUENT ENCOUNTER: ICD-10-CM

## 2023-06-06 DIAGNOSIS — S83.511D RUPTURE OF ANTERIOR CRUCIATE LIGAMENT OF RIGHT KNEE, SUBSEQUENT ENCOUNTER: ICD-10-CM

## 2023-06-06 DIAGNOSIS — Q25.43 AORTIC ROOT ANEURYSM: ICD-10-CM

## 2023-06-06 DIAGNOSIS — E78.2 MIXED HYPERLIPIDEMIA: ICD-10-CM

## 2023-06-06 DIAGNOSIS — S22.41XD CLOSED FRACTURE OF MULTIPLE RIBS OF RIGHT SIDE WITH ROUTINE HEALING, SUBSEQUENT ENCOUNTER: ICD-10-CM

## 2023-06-06 DIAGNOSIS — Z00.00 ANNUAL PHYSICAL EXAM: Primary | ICD-10-CM

## 2023-06-06 DIAGNOSIS — Z12.5 SCREENING FOR PROSTATE CANCER: ICD-10-CM

## 2023-06-06 DIAGNOSIS — I25.10 CORONARY ARTERY DISEASE INVOLVING NATIVE CORONARY ARTERY OF NATIVE HEART WITHOUT ANGINA PECTORIS: ICD-10-CM

## 2023-06-06 DIAGNOSIS — M89.9 DISORDER OF BONE, UNSPECIFIED: ICD-10-CM

## 2023-06-06 DIAGNOSIS — M1A.09X0 IDIOPATHIC CHRONIC GOUT OF MULTIPLE SITES WITHOUT TOPHUS: ICD-10-CM

## 2023-06-06 DIAGNOSIS — Z11.59 NEED FOR HEPATITIS C SCREENING TEST: ICD-10-CM

## 2023-06-06 DIAGNOSIS — N52.9 ERECTILE DYSFUNCTION, UNSPECIFIED ERECTILE DYSFUNCTION TYPE: ICD-10-CM
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
## 2023-06-06 DIAGNOSIS — S83.411D COMPLETE TEAR OF MEDIAL COLLATERAL LIGAMENT OF RIGHT KNEE, SUBSEQUENT ENCOUNTER: ICD-10-CM

## 2023-06-06 DIAGNOSIS — S06.9X9D TRAUMATIC BRAIN INJURY WITH LOSS OF CONSCIOUSNESS, SUBSEQUENT ENCOUNTER: ICD-10-CM

## 2023-06-06 LAB
ALBUMIN SERPL BCG-MCNC: 4.3 G/DL (ref 3.5–5.2)
ALBUMIN UR-MCNC: NEGATIVE MG/DL
ALP SERPL-CCNC: 98 U/L (ref 40–129)
ALT SERPL W P-5'-P-CCNC: 15 U/L (ref 10–50)
ANION GAP SERPL CALCULATED.3IONS-SCNC: 11 MMOL/L (ref 7–15)
APPEARANCE UR: CLEAR
AST SERPL W P-5'-P-CCNC: 20 U/L (ref 10–50)
BILIRUB SERPL-MCNC: 1 MG/DL
BILIRUB UR QL STRIP: NEGATIVE
BUN SERPL-MCNC: 21 MG/DL (ref 8–23)
CALCIUM SERPL-MCNC: 9.7 MG/DL (ref 8.8–10.2)
CHLORIDE SERPL-SCNC: 105 MMOL/L (ref 98–107)
CHOLEST SERPL-MCNC: 251 MG/DL
COLOR UR AUTO: YELLOW
CREAT SERPL-MCNC: 1.02 MG/DL (ref 0.67–1.17)
DEPRECATED CALCIDIOL+CALCIFEROL SERPL-MC: 65 UG/L (ref 20–75)
DEPRECATED HCO3 PLAS-SCNC: 24 MMOL/L (ref 22–29)
ERYTHROCYTE [DISTWIDTH] IN BLOOD BY AUTOMATED COUNT: 12 % (ref 10–15)
GFR SERPL CREATININE-BSD FRML MDRD: 78 ML/MIN/1.73M2
GLUCOSE SERPL-MCNC: 95 MG/DL (ref 70–99)
GLUCOSE UR STRIP-MCNC: NEGATIVE MG/DL
HCT VFR BLD AUTO: 41.5 % (ref 40–53)
HCV AB SERPL QL IA: NONREACTIVE
HDLC SERPL-MCNC: 66 MG/DL
HGB BLD-MCNC: 14.1 G/DL (ref 13.3–17.7)
HGB UR QL STRIP: NEGATIVE
KETONES UR STRIP-MCNC: NEGATIVE MG/DL
LDLC SERPL CALC-MCNC: 169 MG/DL
LEUKOCYTE ESTERASE UR QL STRIP: NEGATIVE
MCH RBC QN AUTO: 30.5 PG (ref 26.5–33)
MCHC RBC AUTO-ENTMCNC: 34 G/DL (ref 31.5–36.5)
MCV RBC AUTO: 90 FL (ref 78–100)
NITRATE UR QL: NEGATIVE
NONHDLC SERPL-MCNC: 185 MG/DL
PH UR STRIP: 6 [PH] (ref 5–8)
PLATELET # BLD AUTO: 202 10E3/UL (ref 150–450)
POTASSIUM SERPL-SCNC: 5.2 MMOL/L (ref 3.4–5.3)
PROT SERPL-MCNC: 7.1 G/DL (ref 6.4–8.3)
PSA SERPL DL<=0.01 NG/ML-MCNC: 0.76 NG/ML (ref 0–6.5)
RBC # BLD AUTO: 4.62 10E6/UL (ref 4.4–5.9)
SODIUM SERPL-SCNC: 140 MMOL/L (ref 136–145)
SP GR UR STRIP: 1.02 (ref 1–1.03)
TRIGL SERPL-MCNC: 79 MG/DL
TSH SERPL DL<=0.005 MIU/L-ACNC: 1.85 UIU/ML (ref 0.3–4.2)
UROBILINOGEN UR STRIP-ACNC: 0.2 E.U./DL
VIT B12 SERPL-MCNC: 727 PG/ML (ref 232–1245)
WBC # BLD AUTO: 4.9 10E3/UL (ref 4–11)

## 2023-06-06 PROCEDURE — 81003 URINALYSIS AUTO W/O SCOPE: CPT | Performed by: INTERNAL MEDICINE

## 2023-06-06 PROCEDURE — G0103 PSA SCREENING: HCPCS | Performed by: INTERNAL MEDICINE

## 2023-06-06 PROCEDURE — 80061 LIPID PANEL: CPT | Performed by: INTERNAL MEDICINE

## 2023-06-06 PROCEDURE — 82607 VITAMIN B-12: CPT | Performed by: INTERNAL MEDICINE

## 2023-06-06 PROCEDURE — 86803 HEPATITIS C AB TEST: CPT | Performed by: INTERNAL MEDICINE

## 2023-06-06 PROCEDURE — 99214 OFFICE O/P EST MOD 30 MIN: CPT | Mod: 25 | Performed by: INTERNAL MEDICINE

## 2023-06-06 PROCEDURE — 82306 VITAMIN D 25 HYDROXY: CPT | Performed by: INTERNAL MEDICINE

## 2023-06-06 PROCEDURE — 80053 COMPREHEN METABOLIC PANEL: CPT | Performed by: INTERNAL MEDICINE

## 2023-06-06 PROCEDURE — G0439 PPPS, SUBSEQ VISIT: HCPCS | Performed by: INTERNAL MEDICINE

## 2023-06-06 PROCEDURE — 84443 ASSAY THYROID STIM HORMONE: CPT | Performed by: INTERNAL MEDICINE

## 2023-06-06 PROCEDURE — 36415 COLL VENOUS BLD VENIPUNCTURE: CPT | Performed by: INTERNAL MEDICINE

## 2023-06-06 PROCEDURE — 85027 COMPLETE CBC AUTOMATED: CPT | Performed by: INTERNAL MEDICINE

## 2023-06-06 RX ORDER — KETOROLAC TROMETHAMINE 10 MG/1
TABLET, FILM COATED ORAL
COMMUNITY
Start: 2023-01-16 | End: 2024-09-24

## 2023-06-06 RX ORDER — SILDENAFIL 100 MG/1
50-100 TABLET, FILM COATED ORAL DAILY PRN
Qty: 30 TABLET | Refills: 4 | Status: SHIPPED | OUTPATIENT
Start: 2023-06-06

## 2023-06-06 ASSESSMENT — ENCOUNTER SYMPTOMS
HEMATOCHEZIA: 0
DYSURIA: 0
EYE PAIN: 0
PARESTHESIAS: 0
WEAKNESS: 1
HEADACHES: 0
CONSTIPATION: 0
FREQUENCY: 0
HEMATURIA: 0
MYALGIAS: 1
NAUSEA: 0
CHILLS: 0
NERVOUS/ANXIOUS: 0
HEARTBURN: 0
PALPITATIONS: 0
SHORTNESS OF BREATH: 0
JOINT SWELLING: 1
SORE THROAT: 0
DIZZINESS: 0
ABDOMINAL PAIN: 0
FEVER: 0
DIARRHEA: 0
COUGH: 0
ARTHRALGIAS: 1

## 2023-06-06 ASSESSMENT — ACTIVITIES OF DAILY LIVING (ADL): CURRENT_FUNCTION: NO ASSISTANCE NEEDED

## 2023-06-06 ASSESSMENT — PAIN SCALES - GENERAL: PAINLEVEL: MILD PAIN (2)

## 2023-06-06 NOTE — LETTER
June 7, 2023      Boyd Veras  [address] [address]        Dear ,    We are writing to inform you of your test results.    Your labs are stable, which is excellent.  Your cholesterol is still elevated and given your history of heart disease, I strongly recommend that you resume taking rosuvastatin and aspirin to prevent future heart attacks and strokes.    Resulted Orders   Hepatitis C Screen Reflex to HCV RNA Quant and Genotype   Result Value Ref Range    Hepatitis C Antibody Nonreactive Nonreactive    Narrative    Assay performance characteristics have not been established for newborns, infants, and children.   CBC with platelets   Result Value Ref Range    WBC Count 4.9 4.0 - 11.0 10e3/uL    RBC Count 4.62 4.40 - 5.90 10e6/uL    Hemoglobin 14.1 13.3 - 17.7 g/dL    Hematocrit 41.5 40.0 - 53.0 %    MCV 90 78 - 100 fL    MCH 30.5 26.5 - 33.0 pg    MCHC 34.0 31.5 - 36.5 g/dL    RDW 12.0 10.0 - 15.0 %    Platelet Count 202 150 - 450 10e3/uL   Comprehensive metabolic panel   Result Value Ref Range    Sodium 140 136 - 145 mmol/L    Potassium 5.2 3.4 - 5.3 mmol/L    Chloride 105 98 - 107 mmol/L    Carbon Dioxide (CO2) 24 22 - 29 mmol/L    Anion Gap 11 7 - 15 mmol/L    Urea Nitrogen 21.0 8.0 - 23.0 mg/dL    Creatinine 1.02 0.67 - 1.17 mg/dL    Calcium 9.7 8.8 - 10.2 mg/dL    Glucose 95 70 - 99 mg/dL    Alkaline Phosphatase 98 40 - 129 U/L    AST 20 10 - 50 U/L    ALT 15 10 - 50 U/L    Protein Total 7.1 6.4 - 8.3 g/dL    Albumin 4.3 3.5 - 5.2 g/dL    Bilirubin Total 1.0 <=1.2 mg/dL    GFR Estimate 78 >60 mL/min/1.73m2      Comment:      eGFR calculated using 2021 CKD-EPI equation.   Lipid panel reflex to direct LDL Fasting   Result Value Ref Range    Cholesterol 251 (H) <200 mg/dL    Triglycerides 79 <150 mg/dL    Direct Measure HDL 66 >=40 mg/dL    LDL Cholesterol Calculated 169 (H) <=100 mg/dL    Non HDL Cholesterol 185 (H) <130 mg/dL    Narrative    Cholesterol  Desirable:  <200  mg/dL    Triglycerides  Normal:  Less than 150 mg/dL  Borderline High:  150-199 mg/dL  High:  200-499 mg/dL  Very High:  Greater than or equal to 500 mg/dL    Direct Measure HDL  Female:  Greater than or equal to 50 mg/dL   Male:  Greater than or equal to 40 mg/dL    LDL Cholesterol  Desirable:  <100mg/dL  Above Desirable:  100-129 mg/dL   Borderline High:  130-159 mg/dL   High:  160-189 mg/dL   Very High:  >= 190 mg/dL    Non HDL Cholesterol  Desirable:  130 mg/dL  Above Desirable:  130-159 mg/dL  Borderline High:  160-189 mg/dL  High:  190-219 mg/dL  Very High:  Greater than or equal to 220 mg/dL   Prostate Specific Antigen Screen   Result Value Ref Range    Prostate Specific Antigen Screen 0.76 0.00 - 6.50 ng/mL    Narrative    This result is obtained using the Roche ElecSub10 Systemss total PSA method on the sujata e801 immunoassay analyzer. Results obtained with different assay methods or kits cannot be used interchangeably.   TSH with free T4 reflex   Result Value Ref Range    TSH 1.85 0.30 - 4.20 uIU/mL   Vitamin D Deficiency   Result Value Ref Range    Vitamin D, Total (25-Hydroxy) 65 20 - 75 ug/L    Narrative    Season, race, dietary intake, and treatment affect the concentration of 25-hydroxy-Vitamin D. Values may decrease during winter months and increase during summer months. Values 20-29 ug/L may indicate Vitamin D insufficiency and values <20 ug/L may indicate Vitamin D deficiency.    Vitamin D determination is routinely performed by an immunoassay specific for 25 hydroxyvitamin D3.  If an individual is on vitamin D2(ergocalciferol) supplementation, please specify 25 OH vitamin D2 and D3 level determination by LCMSMS test VITD23.     Vitamin B12   Result Value Ref Range    Vitamin B12 727 232 - 1,245 pg/mL   UA Macroscopic with reflex to Microscopic and Culture   Result Value Ref Range    Color Urine Yellow Colorless, Straw, Light Yellow, Yellow    Appearance Urine Clear Clear    Glucose Urine Negative Negative  mg/dL    Bilirubin Urine Negative Negative    Ketones Urine Negative Negative mg/dL    Specific Gravity Urine 1.025 1.005 - 1.030    Blood Urine Negative Negative    pH Urine 6.0 5.0 - 8.0    Protein Albumin Urine Negative Negative mg/dL    Urobilinogen Urine 0.2 0.2, 1.0 E.U./dL    Nitrite Urine Negative Negative    Leukocyte Esterase Urine Negative Negative    Narrative    Microscopic not indicated       If you have any questions or concerns, please call the clinic at the number listed above.       Sincerely,      Mitchel Webster MD

## 2023-06-06 NOTE — TELEPHONE ENCOUNTER
Nurse Triage SBAR    Situation: Tick bite    Background: Patient calling.     Assessment: Deer tick embedded in his skin. Pt states it was bloated. Denied feeling feverish. No redness.     Recommendation: Routing to PCP and care team. Please contact patient for further recommendation and advisement.      Barb Benson, RN Nursing Advisor 6/6/2023 6:05 PM     Reason for Disposition    [1] Probable deer tick AND [2] attached 36 hours or more (or tick appears swollen, not flat) AND [3] occurred in an area where Lyme disease is common    Additional Information    Negative: Sounds like a life-threatening emergency to the triager    Negative: Not a tick bite    Negative: [1] 2 to 14 days following tick bite AND [2] severe headache with fever occurs    Negative: [1] 2 to 14 days following tick bite AND [2] widespread rash with fever occurs    Negative: Patient sounds very sick or weak to the triager    Negative: [1] Fever AND [2] spreading red area or streak    Negative: [1] Fever AND [2] area is very tender to touch    Negative: [1] Red streak or red line AND [2] length > 2 inches (5 cm)    Negative: Can't remove live tick (after trying Care Advice)    Negative: [1] 2 to 14 days following tick bite AND [2] fever AND [3] no rash or headache    Negative: [1] 2 to 14 days following tick bite AND [2] widespread rash or headache AND [3] no fever    Negative: [1] Red or very tender (to touch) area AND [2] started over 24 hours after the bite    Negative: Red ring or bull's-eye rash occurs at tick bite    Protocols used: TICK BITE-A-

## 2023-06-06 NOTE — PROGRESS NOTES
"SUBJECTIVE:   Boyd is a 72 year old who presents for Preventive Visit.      6/6/2023     7:06 AM   Additional Questions   Roomed by carolann   Accompanied by alone         6/6/2023     7:06 AM   Patient Reported Additional Medications   Patient reports taking the following new medications none     Are you in the first 12 months of your Medicare coverage?  No    Healthy Habits:     In general, how would you rate your overall health?  Good    Frequency of exercise:  6-7 days/week    Duration of exercise:  Greater than 60 minutes    Do you usually eat at least 4 servings of fruit and vegetables a day, include whole grains    & fiber and avoid regularly eating high fat or \"junk\" foods?  Yes    Taking medications regularly:  Yes    Medication side effects:  None    Ability to successfully perform activities of daily living:  No assistance needed    Home Safety:  No safety concerns identified    Hearing Impairment:  No hearing concerns    In the past 6 months, have you been bothered by leaking of urine?  No    In general, how would you rate your overall mental or emotional health?  Good      PHQ-2 Total Score: 0    Additional concerns today:  No        Have you ever done Advance Care Planning? (For example, a Health Directive, POLST, or a discussion with a medical provider or your loved ones about your wishes): No, advance care planning information given to patient to review.  Patient declined advance care planning discussion at this time.       Fall risk  Fallen 2 or more times in the past year?: Yes (pt fall 1 time this year a dead tree fall on patient)  Any fall with injury in the past year?: Yes    Cognitive Screening   1) Repeat 3 items (Leader, Season, Table)    2) Clock draw: NORMAL  3) 3 item recall: Recalls 3 objects  Results: 3 items recalled: COGNITIVE IMPAIRMENT LESS LIKELY    Mini-CogTM Copyright S Krystina. Licensed by the author for use in [facility]; reprinted with permission " (javier@[facility]). All rights reserved.      Do you have sleep apnea, excessive snoring or daytime drowsiness?: no    Reviewed and updated as needed this visit by clinical staff   Tobacco  Allergies  Meds              Reviewed and updated as needed this visit by Provider                 Social History     Tobacco Use     Smoking status: Never     Smokeless tobacco: Never   Vaping Use     Vaping status: Never Used   Substance Use Topics     Alcohol use: Yes     Alcohol/week: 5.0 standard drinks of alcohol     Comment: 3-4 / week           6/6/2023     7:04 AM   Alcohol Use   Prescreen: >3 drinks/day or >7 drinks/week? No     Do you have a current opioid prescription? No  Do you use any other controlled substances or medications that are not prescribed by a provider? None    Current providers sharing in care for this patient include:   Patient Care Team:  Mitchel Webster MD as PCP - General (Internal Medicine)  Mitchel Webster MD as Assigned PCP    The following health maintenance items are reviewed in Epic and correct as of today:  Health Maintenance   Topic Date Due     Pneumococcal Vaccine: 65+ Years (1 - PCV) Never done     HEPATITIS C SCREENING  Never done     ZOSTER IMMUNIZATION (1 of 2) Never done     COVID-19 Vaccine (2 - Booster for Gila series) 12/15/2021     MEDICARE ANNUAL WELLNESS VISIT  05/31/2023     INFLUENZA VACCINE (Season Ended) 09/01/2023     ANNUAL REVIEW OF HM ORDERS  01/03/2024     FALL RISK ASSESSMENT  06/06/2024     COLORECTAL CANCER SCREENING  12/05/2025     ADVANCE CARE PLANNING  01/04/2026     LIPID  01/03/2028     DTAP/TDAP/TD IMMUNIZATION (6 - Td or Tdap) 11/08/2032     PHQ-2 (once per calendar year)  Completed     AORTIC ANEURYSM SCREENING (SYSTEM ASSIGNED)  Completed     IPV IMMUNIZATION  Aged Out     MENINGITIS IMMUNIZATION  Aged Out     Review of Systems   Constitutional: Negative for chills and fever.   HENT: Negative for congestion, ear pain, hearing loss and sore throat.   "  Eyes: Negative for pain and visual disturbance.   Respiratory: Negative for cough and shortness of breath.    Cardiovascular: Positive for peripheral edema. Negative for chest pain and palpitations.   Gastrointestinal: Negative for abdominal pain, constipation, diarrhea, heartburn, hematochezia and nausea.   Genitourinary: Positive for impotence. Negative for dysuria, frequency, genital sores, hematuria, penile discharge and urgency.   Musculoskeletal: Positive for arthralgias, joint swelling and myalgias.   Skin: Negative for rash.   Neurological: Positive for weakness. Negative for dizziness, headaches and paresthesias.   Psychiatric/Behavioral: Positive for mood changes. The patient is not nervous/anxious.        OBJECTIVE:   /79 (BP Location: Left arm, Patient Position: Sitting, Cuff Size: Adult Regular)   Pulse 60   Temp 97  F (36.1  C) (Tympanic)   Resp 16   Ht 1.771 m (5' 9.72\")   Wt 76.2 kg (168 lb 1.6 oz)   SpO2 98%   BMI 24.31 kg/m   Estimated body mass index is 24.31 kg/m  as calculated from the following:    Height as of this encounter: 1.771 m (5' 9.72\").    Weight as of this encounter: 76.2 kg (168 lb 1.6 oz).  Physical Exam  EYES: Eyelids, conjunctiva, and sclera were normal. Pupils were normal. Cornea, iris, and lens were normal bilaterally.  HEAD, EARS, NOSE, MOUTH, AND THROAT: Head and face were normal. Hearing was normal to voice and the ears were normal to external exam. Nose appearance was normal and there was no discharge. Oropharynx was normal.  NECK: Neck appearance was normal. There were no neck masses and the thyroid was not enlarged.  RESPIRATORY: Breathing pattern was normal and the chest moved symmetrically.  Percussion/auscultatory percussion was normal.  Lung sounds were normal and there were no abnormal sounds.  CARDIOVASCULAR: Heart rate and rhythm were normal.  S1 and S2 were normal and there were no extra sounds or murmurs. Peripheral pulses in arms and legs were " normal.  Jugular venous pressure was normal.  There was no peripheral edema.  GASTROINTESTINAL: The abdomen was normal in contour.  Bowel sounds were present.  Percussion detected no organ enlargement or tenderness.  Palpation detected no tenderness, mass, or enlarged organs.   MUSCULOSKELETAL: Skeletal configuration was normal and muscle mass was normal for age. Joint appearance was overall normal.  LYMPHATIC: There were no enlarged nodes.  SKIN/HAIR/NAILS: Skin color was normal.  There were no skin lesions.  Hair and nails were normal.  NEUROLOGIC: The patient was alert and oriented to person, place, time, and circumstance. Speech was normal. Cranial nerves were normal. Motor strength was normal for age. The patient was normally coordinated.  PSYCHIATRIC:  Mood and affect were normal and the patient had normal recent and remote memory. The patient's judgment and insight were normal.    ASSESSMENT / PLAN:   1. Annual physical exam  This is a 72 man with issues as discussed below    2. Need for hepatitis C screening test  - Hepatitis C Screen Reflex to HCV RNA Quant and Genotype; Future  - Hepatitis C Screen Reflex to HCV RNA Quant and Genotype    3. Screening for prostate cancer  - Prostate Specific Antigen Screen; Future  - Prostate Specific Antigen Screen    4. CAD, no MI, LYNDSEY LADx2 2014, RCA 2016  Strongly recommended he resume her rosuvastatin which she will consider  - CBC with platelets; Future  - Comprehensive metabolic panel; Future  - Lipid panel reflex to direct LDL Fasting; Future  - UA Macroscopic with reflex to Microscopic and Culture; Future  - CBC with platelets  - Comprehensive metabolic panel  - Lipid panel reflex to direct LDL Fasting  - UA Macroscopic with reflex to Microscopic and Culture    5. Aortic root aneurysm (H)  4.2 cm in 2021, was 3.7 cm in 2016  - Echocardiogram Complete; Future    6. Mixed hyperlipidemia  See above regarding rosuvastatin  - TSH with free T4 reflex; Future  - TSH with  free T4 reflex    7. Idiopathic chronic gout of multiple sites without tophus  Continue allopurinol    8. Traumatic brain injury with loss of consciousness, subsequent encounter  Seems to be doing well, continues to work  - Vitamin B12; Future  - Vitamin B12    9. Compression fracture of T10 vertebra with routine healing, subsequent encounter  Discussed screening for osteoporosis which she declines  - Vitamin D Deficiency; Future  - Vitamin D Deficiency    10. Complete tear of medial collateral ligament of right knee, subsequent encounter  11. Rupture of anterior cruciate ligament of right knee, subsequent encounter  Status post ACL meniscal repair doing well    12. Closed fracture of multiple ribs of right side with routine healing, subsequent encounter  Still with occasional pain where he broke his ribs but this is stable    13. Disorder of bone, unspecified  - Vitamin D Deficiency; Future  - Vitamin D Deficiency    14. Erectile dysfunction, unspecified erectile dysfunction type  - sildenafil (VIAGRA) 100 MG tablet; Take 0.5-1 tablets ( mg) by mouth daily as needed (ED)  Dispense: 30 tablet; Refill: 4    COUNSELING:  Reviewed preventive health counseling, as reflected in patient instructions        He reports that he has never smoked. He has never used smokeless tobacco.      Appropriate preventive services were discussed with this patient, including applicable screening as appropriate for cardiovascular disease, diabetes, osteopenia/osteoporosis, and glaucoma.  As appropriate for age/gender, discussed screening for colorectal cancer, prostate cancer, breast cancer, and cervical cancer. Checklist reviewing preventive services available has been given to the patient.    Reviewed patients plan of care and provided an AVS. The Basic Care Plan (routine screening as documented in Health Maintenance) for Boyd meets the Care Plan requirement. This Care Plan has been established and reviewed with the  Patient.    Mitchel Webster MD  [facility]    Identified Health Risks:    I have reviewed Opioid Use Disorder and Substance Use Disorder risk factors and made any needed referrals.

## 2023-06-07 ENCOUNTER — TELEPHONE (OUTPATIENT)
Dept: INTERNAL MEDICINE | Facility: CLINIC | Age: 73
End: 2023-06-07
Payer: COMMERCIAL

## 2023-06-07 RX ORDER — DOXYCYCLINE 100 MG/1
200 TABLET ORAL ONCE
Qty: 2 TABLET | Refills: 0 | Status: SHIPPED | OUTPATIENT
Start: 2023-06-07 | End: 2023-06-07

## 2023-06-07 NOTE — TELEPHONE ENCOUNTER
7:10am  Left voicemail with call back number in regards to triage (Tick Bite) message.  Pt had annual wellness yesterday. MD stated that this was not brought up/ discussed. MD aware of message.

## 2023-06-07 NOTE — TELEPHONE ENCOUNTER
General Call      Reason for Call:  Pt removed [place] Tick from right torso at his home,  No bulls eye - this was removed on 06/06/2023    Wondering what he should be doing     Please advise    What are your questions or concerns:  n/a    Date of last appointment with provider: n/a    Okay to leave a detailed message?: Yes at Cell number on file:    Telephone Information:   Mobile [phone]

## 2023-06-07 NOTE — TELEPHONE ENCOUNTER
General Call    Contacts       Type Contact Phone/Fax    06/07/2023 03:06 PM CDT Phone (Incoming) Boyd Veras (Self) [phone] (M)        Reason for Call:  Echocardiogram    What are your questions or concerns:  Patient received a call regarding an MRI of his heart, and he was unclear what this was for and has a few questions.    Date of last appointment with provider: n/a    Okay to leave a detailed message?: Yes at Cell number on file:    Telephone Information:   Mobile [phone]

## 2023-06-07 NOTE — TELEPHONE ENCOUNTER
Spoke with patient and relayed message from PCP.    Patient advised to return call to clinic with any s/s that arise.    No further questions at time of call.

## 2023-06-08 NOTE — TELEPHONE ENCOUNTER
Information from 6/6/23 office visit with Dr. Webster:     5. Aortic root aneurysm (H)  4.2 cm in 2021, was 3.7 cm in 2016  - Echocardiogram Complete; Future      Spoke with patient and reviewed with him note from visit with Dr. Webster on 6/6/23. Pt stated understanding and already has echocardiogram appointment scheduled. No further questions at the time of this phone call.

## 2023-06-27 DIAGNOSIS — M1A.09X0 IDIOPATHIC CHRONIC GOUT OF MULTIPLE SITES WITHOUT TOPHUS: ICD-10-CM

## 2023-06-28 RX ORDER — ALLOPURINOL 100 MG/1
100 TABLET ORAL DAILY
Qty: 90 TABLET | Refills: 4 | Status: SHIPPED | OUTPATIENT
Start: 2023-06-28 | End: 2024-09-20

## 2023-06-28 NOTE — TELEPHONE ENCOUNTER
"Rx filled.    Routing refill request to provider for review/approval because:  Labs not current:  Uric 7.3 last drawn 5/31/22. Has an appt. On 7/18/23    Last Written Prescription Date:  10/19/21  Last Fill Quantity: 90,  # refills: 3   Last office visit provider:  6/6/23     Requested Prescriptions   Pending Prescriptions Disp Refills     allopurinol (ZYLOPRIM) 100 MG tablet 90 tablet 3     Sig: Take 1 tablet (100 mg) by mouth daily       Gout Agents Protocol Failed - 6/27/2023 10:09 AM        Failed - Has Uric Acid on file in past 12 months and value is less than 6     Recent Labs   Lab Test 05/31/22  1259   URIC 7.3     If level is 6mg/dL or greater, ok to refill one time and refer to provider.           Passed - CBC on file in past 12 months     Recent Labs   Lab Test 06/06/23  0818   WBC 4.9   RBC 4.62   HGB 14.1   HCT 41.5                    Passed - ALT on file in past 12 months     Recent Labs   Lab Test 06/06/23  0818   ALT 15             Passed - Recent (12 mo) or future (30 days) visit within the authorizing provider's specialty     Patient has had an office visit with the authorizing provider or a provider within the authorizing providers department within the previous 12 mos or has a future within next 30 days. See \"Patient Info\" tab in inbasket, or \"Choose Columns\" in Meds & Orders section of the refill encounter.              Passed - Medication is active on med list        Passed - Patient is age 18 or older        Passed - Normal serum creatinine on file in the past 12 months     Recent Labs   Lab Test 06/06/23  0818   CR 1.02       Ok to refill medication if creatinine is low               Danuta Anna, RN 06/28/23 3:23 AM  "

## 2023-07-18 ENCOUNTER — HOSPITAL ENCOUNTER (OUTPATIENT)
Dept: CARDIOLOGY | Facility: HOSPITAL | Age: 73
Discharge: HOME | End: 2023-07-18
Attending: INTERNAL MEDICINE | Admitting: INTERNAL MEDICINE
Payer: COMMERCIAL

## 2023-07-18 DIAGNOSIS — Q25.43 AORTIC ROOT ANEURYSM: ICD-10-CM

## 2023-07-18 LAB — LVEF ECHO: NORMAL

## 2023-07-18 PROCEDURE — 93306 TTE W/DOPPLER COMPLETE: CPT | Mod: 26 | Performed by: INTERNAL MEDICINE

## 2023-07-18 PROCEDURE — 93306 TTE W/DOPPLER COMPLETE: CPT

## 2023-07-19 ENCOUNTER — TELEPHONE (OUTPATIENT)
Dept: INTERNAL MEDICINE | Facility: CLINIC | Age: 73
End: 2023-07-19
Payer: COMMERCIAL

## 2023-08-22 ENCOUNTER — LAB REQUISITION (OUTPATIENT)
Dept: LAB | Facility: CLINIC | Age: 73
End: 2023-08-22
Payer: COMMERCIAL

## 2023-08-22 DIAGNOSIS — Z48.89 ENCOUNTER FOR OTHER SPECIFIED SURGICAL AFTERCARE: ICD-10-CM

## 2023-08-22 DIAGNOSIS — M25.469 EFFUSION, UNSPECIFIED KNEE: ICD-10-CM

## 2023-08-22 LAB
APPEARANCE FLD: ABNORMAL
CELL COUNT BODY FLUID SOURCE: ABNORMAL
COLOR FLD: ABNORMAL
CRYSTALS SNV MICRO: NORMAL
EOSINOPHIL NFR FLD MANUAL: 1 %
GRAM STAIN RESULT: NORMAL
GRAM STAIN RESULT: NORMAL
LYMPHOCYTES NFR FLD MANUAL: 37 %
MONOS+MACROS NFR FLD MANUAL: 32 %
NEUTS BAND NFR FLD MANUAL: 30 %
WBC # FLD AUTO: 110 /UL

## 2023-08-22 PROCEDURE — 87205 SMEAR GRAM STAIN: CPT | Mod: ORL | Performed by: PHYSICIAN ASSISTANT

## 2023-08-22 PROCEDURE — 89051 BODY FLUID CELL COUNT: CPT | Mod: ORL | Performed by: PHYSICIAN ASSISTANT

## 2023-08-22 PROCEDURE — 87070 CULTURE OTHR SPECIMN AEROBIC: CPT | Mod: ORL | Performed by: PHYSICIAN ASSISTANT

## 2023-08-22 PROCEDURE — 89060 EXAM SYNOVIAL FLUID CRYSTALS: CPT | Mod: ORL | Performed by: PHYSICIAN ASSISTANT

## 2023-08-27 LAB — BACTERIA SNV CULT: NO GROWTH

## 2023-11-01 ENCOUNTER — OFFICE VISIT (OUTPATIENT)
Dept: INTERNAL MEDICINE | Facility: CLINIC | Age: 73
End: 2023-11-01
Payer: COMMERCIAL

## 2023-11-01 VITALS
RESPIRATION RATE: 16 BRPM | OXYGEN SATURATION: 98 % | HEART RATE: 60 BPM | BODY MASS INDEX: 23.62 KG/M2 | TEMPERATURE: 97 F | DIASTOLIC BLOOD PRESSURE: 84 MMHG | HEIGHT: 70 IN | WEIGHT: 165 LBS | SYSTOLIC BLOOD PRESSURE: 132 MMHG

## 2023-11-01 DIAGNOSIS — M17.31 POST-TRAUMATIC OSTEOARTHRITIS OF RIGHT KNEE: ICD-10-CM

## 2023-11-01 DIAGNOSIS — E78.2 MIXED HYPERLIPIDEMIA: ICD-10-CM

## 2023-11-01 DIAGNOSIS — I25.10 CORONARY ARTERY DISEASE INVOLVING NATIVE CORONARY ARTERY OF NATIVE HEART WITHOUT ANGINA PECTORIS: ICD-10-CM

## 2023-11-01 DIAGNOSIS — M1A.09X0 IDIOPATHIC CHRONIC GOUT OF MULTIPLE SITES WITHOUT TOPHUS: ICD-10-CM

## 2023-11-01 DIAGNOSIS — Q25.43 AORTIC ROOT ANEURYSM: ICD-10-CM

## 2023-11-01 DIAGNOSIS — S06.9X9D TRAUMATIC BRAIN INJURY WITH LOSS OF CONSCIOUSNESS, SUBSEQUENT ENCOUNTER: ICD-10-CM

## 2023-11-01 DIAGNOSIS — E83.52 HYPERCALCEMIA: ICD-10-CM

## 2023-11-01 DIAGNOSIS — Z01.818 PREOPERATIVE EXAMINATION: Primary | ICD-10-CM

## 2023-11-01 LAB
ANION GAP SERPL CALCULATED.3IONS-SCNC: 9 MMOL/L (ref 7–15)
ATRIAL RATE - MUSE: 48 BPM
BUN SERPL-MCNC: 19.4 MG/DL (ref 8–23)
CALCIUM SERPL-MCNC: 11 MG/DL (ref 8.8–10.2)
CHLORIDE SERPL-SCNC: 105 MMOL/L (ref 98–107)
CREAT SERPL-MCNC: 1.02 MG/DL (ref 0.67–1.17)
DEPRECATED HCO3 PLAS-SCNC: 26 MMOL/L (ref 22–29)
DIASTOLIC BLOOD PRESSURE - MUSE: NORMAL MMHG
EGFRCR SERPLBLD CKD-EPI 2021: 77 ML/MIN/1.73M2
ERYTHROCYTE [DISTWIDTH] IN BLOOD BY AUTOMATED COUNT: 11.7 % (ref 10–15)
GLUCOSE SERPL-MCNC: 92 MG/DL (ref 70–99)
HCT VFR BLD AUTO: 41.4 % (ref 40–53)
HGB BLD-MCNC: 13.7 G/DL (ref 13.3–17.7)
INTERPRETATION ECG - MUSE: NORMAL
MCH RBC QN AUTO: 30 PG (ref 26.5–33)
MCHC RBC AUTO-ENTMCNC: 33.1 G/DL (ref 31.5–36.5)
MCV RBC AUTO: 91 FL (ref 78–100)
P AXIS - MUSE: 35 DEGREES
PLATELET # BLD AUTO: 200 10E3/UL (ref 150–450)
POTASSIUM SERPL-SCNC: 4.7 MMOL/L (ref 3.4–5.3)
PR INTERVAL - MUSE: 184 MS
QRS DURATION - MUSE: 112 MS
QT - MUSE: 442 MS
QTC - MUSE: 394 MS
R AXIS - MUSE: -12 DEGREES
RBC # BLD AUTO: 4.56 10E6/UL (ref 4.4–5.9)
SODIUM SERPL-SCNC: 140 MMOL/L (ref 135–145)
SYSTOLIC BLOOD PRESSURE - MUSE: NORMAL MMHG
T AXIS - MUSE: 2 DEGREES
VENTRICULAR RATE- MUSE: 48 BPM
WBC # BLD AUTO: 6 10E3/UL (ref 4–11)

## 2023-11-01 PROCEDURE — 80048 BASIC METABOLIC PNL TOTAL CA: CPT | Performed by: INTERNAL MEDICINE

## 2023-11-01 PROCEDURE — 93005 ELECTROCARDIOGRAM TRACING: CPT | Performed by: INTERNAL MEDICINE

## 2023-11-01 PROCEDURE — 93010 ELECTROCARDIOGRAM REPORT: CPT | Performed by: INTERNAL MEDICINE

## 2023-11-01 PROCEDURE — 85027 COMPLETE CBC AUTOMATED: CPT | Performed by: INTERNAL MEDICINE

## 2023-11-01 PROCEDURE — 99215 OFFICE O/P EST HI 40 MIN: CPT | Mod: 25 | Performed by: INTERNAL MEDICINE

## 2023-11-01 PROCEDURE — 82306 VITAMIN D 25 HYDROXY: CPT | Performed by: INTERNAL MEDICINE

## 2023-11-01 PROCEDURE — 36415 COLL VENOUS BLD VENIPUNCTURE: CPT | Performed by: INTERNAL MEDICINE

## 2023-11-01 RX ORDER — RESPIRATORY SYNCYTIAL VIRUS VACCINE 120MCG/0.5
0.5 KIT INTRAMUSCULAR ONCE
Qty: 1 EACH | Refills: 0 | Status: CANCELLED | OUTPATIENT
Start: 2023-11-01 | End: 2023-11-01

## 2023-11-01 NOTE — PROGRESS NOTES
Preoperative Consultation   Boyd Veras   73 year old  male    Date of visit: 11/1/2023  Physician: Mitchel Webster MD    This is a preoperative consultation requested by Dr. Cooley in preparation for right otal knee arthroplasty on 11/21/2023 at [facility], fax [phone].       Assessment and Plan   Boyd Veras was seen in preoperative consultation in preparation for right total knee arthroplasty.  This is a intermediate risk surgery and the patient has no increased risk for major cardiac complications.  Please note he will hold aspirin and multivitamins 1 week prior to surgery.  He has no cardiopulmonary contraindication to proposed procedure may proceed without further cardiopulmonary testing    1. Preoperative examination    2. Post-traumatic osteoarthritis of right knee    3. CAD, no MI, LYNDSEY LADx2 2014, RCA 2016    4. Aortic root aneurysm (H24)    5. Idiopathic chronic gout of multiple sites without tophus    6. Mixed hyperlipidemia    7. 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  One grandchild.        Past Medical History   Patient Active Problem List   Diagnosis    Idiopathic chronic gout of multiple sites without tophus    CAD, no MI, LYNDSEY LADx2 2014, RCA 2016    Mixed hyperlipidemia    TBI (traumatic brain injury) (H)    Aortic root aneurysm (H24)       Past Surgical History  He has a past surgical history that includes Arthroscopy shoulder (Right) and Knee Arthroscopy W/ Acl Reconstruction And Patella Graft (2023).     History of Present Illness   This 73 year old man comes in for preoperative evaluation.  He has arthritis of the right knee.  He had injury/trauma to this knee and had an ACL repair but it has continued to bother him and is very limited by pain and activity.  He is otherwise  feeling okay    Recent antiplatelet use: yes aspirin  Personal or family history of bleeding or clotting disorders: no  Steroid use in the past year: no  Personal or family history of difficulty with anesthesia: no  Current cardiopulmonary symptoms: no  CHAYA: no    Review of Systems: A comprehensive review of systems was negative except as noted.     Medications and Allergies   Current Outpatient Medications   Medication Sig Dispense Refill    allopurinol (ZYLOPRIM) 100 MG tablet Take 1 tablet (100 mg) by mouth daily 90 tablet 4    ascorbic acid, vitamin C, (VITAMIN C) 500 MG tablet [ASCORBIC ACID, VITAMIN C, (VITAMIN C) 500 MG TABLET] Take 500 mg by mouth.      aspirin 81 MG EC tablet [ASPIRIN 81 MG EC TABLET] Take 81 mg by mouth daily.      ketorolac (TORADOL) 10 MG tablet TAKE 1 TABLET BY MOUTH EVERY 8 HOURS WITH FOOD      multivitamin with minerals tablet [MULTIVITAMIN WITH MINERALS TABLET] Take 1 tablet by mouth.      nitroglycerin (NITROSTAT) 0.4 MG SL tablet [NITROGLYCERIN (NITROSTAT) 0.4 MG SL TABLET] Place 0.4 mg under the tongue.      rosuvastatin (CRESTOR) 20 MG tablet Take 1 tablet (20 mg) by mouth daily      sildenafil (VIAGRA) 100 MG tablet Take 0.5-1 tablets ( mg) by mouth daily as needed (ED) 30 tablet 4     Allergies   Allergen Reactions    Isosorbide Mononitrate [Isosorbide Nitrate] Headache        Family and Social History   Family History   Problem Relation Age of Onset    Dementia Mother     Cancer Father         mesothelioma    Heart Disease Sister     Cancer Sister     No Known Problems Sister     Coronary Artery Disease Brother     No Known Problems Brother     No Known Problems Brother     No Known Problems Daughter     No Known Problems Daughter     No Known Problems Daughter     No Known Problems Daughter     No Known Problems Daughter     No Known Problems Son         Social History     Tobacco Use    Smoking status: Never    Smokeless tobacco: Never   Vaping Use    Vaping Use: Never  "used   Substance Use Topics    Alcohol use: Yes     Alcohol/week: 5.0 standard drinks of alcohol     Comment: 3-4 / week    Drug use: No        Physical Exam   General Appearance:   No acute distress    /84   Pulse 60   Temp 97  F (36.1  C) (Tympanic)   Resp 16   Ht 1.778 m (5' 10\")   Wt 74.8 kg (165 lb)   SpO2 98%   BMI 23.68 kg/m      EYES: Eyelids, conjunctiva, and sclera were normal. Pupils were normal. Cornea, iris, and lens were normal bilaterally.  HEAD, EARS, NOSE, MOUTH, AND THROAT: Head and face were normal. Hearing was normal to voice and the ears were normal to external exam. Nose appearance was normal and there was no discharge.   NECK: Neck appearance was normal.   RESPIRATORY: Breathing pattern was normal and the chest moved symmetrically.  Percussion/auscultatory percussion was normal.  Lung sounds were normal and there were no abnormal sounds.  CARDIOVASCULAR: Heart rate and rhythm were normal.  S1 and S2 were normal and there were no extra sounds or murmurs. Peripheral pulses in arms and legs were normal.  Jugular venous pressure was normal.  There was no peripheral edema.  GASTROINTESTINAL: The abdomen was normal in contour.    NEUROLOGIC: The patient was alert and oriented to person, place, time, and circumstance. Speech was normal. Cranial nerves were normal. Motor strength was normal for age. The patient was normally coordinated.  PSYCHIATRIC:  Mood and affect were normal and the patient had normal recent and remote memory. The patient's judgment and insight were normal.       Additional Tests   Laboratory: Basic metabolic panel and CBC are    Radiology: Reviewed    Electrocardiogram: Sinus bradycardia with incomplete right bundle branch block, personally reviewed    Total time 40 minutes including chart review, history, examination, counseling and documentation     Mitchel Webster MD  Internal Medicine  Contact me at [phone]  "

## 2023-11-02 LAB — VIT D+METAB SERPL-MCNC: 56 NG/ML (ref 20–50)

## 2024-08-01 DIAGNOSIS — E78.2 MIXED HYPERLIPIDEMIA: ICD-10-CM

## 2024-08-05 RX ORDER — ROSUVASTATIN CALCIUM 20 MG/1
20 TABLET, COATED ORAL DAILY
Qty: 90 TABLET | Refills: 4 | Status: SHIPPED | OUTPATIENT
Start: 2024-08-05 | End: 2024-09-24

## 2024-08-05 NOTE — TELEPHONE ENCOUNTER
"Patient reports he did not start rosuvastatin when prescribed in 01/2023 but would like to now.  Rx pended for provider review.    1/3/23 PCP Visit notes: :\"In regards to his cardiac risk factors, I have strongly recommended he restart rosuvastatin and seems mildly open to this.\"  "

## 2024-09-20 DIAGNOSIS — M1A.09X0 IDIOPATHIC CHRONIC GOUT OF MULTIPLE SITES WITHOUT TOPHUS: ICD-10-CM

## 2024-09-20 RX ORDER — ALLOPURINOL 100 MG/1
100 TABLET ORAL DAILY
Qty: 90 TABLET | Refills: 4 | Status: SHIPPED | OUTPATIENT
Start: 2024-09-20

## 2024-09-24 ENCOUNTER — OFFICE VISIT (OUTPATIENT)
Dept: INTERNAL MEDICINE | Facility: CLINIC | Age: 74
End: 2024-09-24
Payer: MEDICARE

## 2024-09-24 VITALS
HEART RATE: 50 BPM | DIASTOLIC BLOOD PRESSURE: 81 MMHG | RESPIRATION RATE: 11 BRPM | SYSTOLIC BLOOD PRESSURE: 134 MMHG | OXYGEN SATURATION: 99 % | WEIGHT: 172.7 LBS | HEIGHT: 70 IN | BODY MASS INDEX: 24.73 KG/M2 | TEMPERATURE: 98.3 F

## 2024-09-24 DIAGNOSIS — I25.10 CORONARY ARTERY DISEASE INVOLVING NATIVE CORONARY ARTERY OF NATIVE HEART WITHOUT ANGINA PECTORIS: ICD-10-CM

## 2024-09-24 DIAGNOSIS — E78.2 MIXED HYPERLIPIDEMIA: ICD-10-CM

## 2024-09-24 DIAGNOSIS — Z00.00 ANNUAL PHYSICAL EXAM: Primary | ICD-10-CM

## 2024-09-24 DIAGNOSIS — E83.52 HYPERCALCEMIA: ICD-10-CM

## 2024-09-24 DIAGNOSIS — M1A.09X0 IDIOPATHIC CHRONIC GOUT OF MULTIPLE SITES WITHOUT TOPHUS: ICD-10-CM

## 2024-09-24 DIAGNOSIS — S06.9X9D TRAUMATIC BRAIN INJURY WITH LOSS OF CONSCIOUSNESS, SUBSEQUENT ENCOUNTER: ICD-10-CM

## 2024-09-24 DIAGNOSIS — Z12.5 SCREENING FOR PROSTATE CANCER: ICD-10-CM

## 2024-09-24 PROBLEM — Q25.43 AORTIC ROOT ANEURYSM: Status: RESOLVED | Noted: 2023-01-03 | Resolved: 2024-09-24

## 2024-09-24 PROBLEM — S06.9XAA TBI (TRAUMATIC BRAIN INJURY) (H): Status: RESOLVED | Noted: 2023-01-03 | Resolved: 2024-09-24

## 2024-09-24 LAB
ALBUMIN SERPL BCG-MCNC: 4.3 G/DL (ref 3.5–5.2)
ALBUMIN UR-MCNC: NEGATIVE MG/DL
ALP SERPL-CCNC: 94 U/L (ref 40–150)
ALT SERPL W P-5'-P-CCNC: 14 U/L (ref 0–70)
ANION GAP SERPL CALCULATED.3IONS-SCNC: 11 MMOL/L (ref 7–15)
APPEARANCE UR: CLEAR
AST SERPL W P-5'-P-CCNC: 23 U/L (ref 0–45)
BILIRUB SERPL-MCNC: 1.4 MG/DL
BILIRUB UR QL STRIP: NEGATIVE
BUN SERPL-MCNC: 22.2 MG/DL (ref 8–23)
CALCIUM SERPL-MCNC: 9.5 MG/DL (ref 8.8–10.4)
CHLORIDE SERPL-SCNC: 106 MMOL/L (ref 98–107)
CHOLEST SERPL-MCNC: 268 MG/DL
COLOR UR AUTO: YELLOW
CREAT SERPL-MCNC: 1.16 MG/DL (ref 0.67–1.17)
EGFRCR SERPLBLD CKD-EPI 2021: 66 ML/MIN/1.73M2
ERYTHROCYTE [DISTWIDTH] IN BLOOD BY AUTOMATED COUNT: 11.9 % (ref 10–15)
FASTING STATUS PATIENT QL REPORTED: ABNORMAL
FASTING STATUS PATIENT QL REPORTED: ABNORMAL
GLUCOSE SERPL-MCNC: 90 MG/DL (ref 70–99)
GLUCOSE UR STRIP-MCNC: NEGATIVE MG/DL
HCO3 SERPL-SCNC: 24 MMOL/L (ref 22–29)
HCT VFR BLD AUTO: 43.2 % (ref 40–53)
HDLC SERPL-MCNC: 73 MG/DL
HGB BLD-MCNC: 14.5 G/DL (ref 13.3–17.7)
HGB UR QL STRIP: NEGATIVE
KETONES UR STRIP-MCNC: NEGATIVE MG/DL
LDLC SERPL CALC-MCNC: 179 MG/DL
LEUKOCYTE ESTERASE UR QL STRIP: NEGATIVE
MCH RBC QN AUTO: 30.7 PG (ref 26.5–33)
MCHC RBC AUTO-ENTMCNC: 33.6 G/DL (ref 31.5–36.5)
MCV RBC AUTO: 92 FL (ref 78–100)
NITRATE UR QL: NEGATIVE
NONHDLC SERPL-MCNC: 195 MG/DL
PH UR STRIP: 5.5 [PH] (ref 5–8)
PLATELET # BLD AUTO: 211 10E3/UL (ref 150–450)
POTASSIUM SERPL-SCNC: 5.5 MMOL/L (ref 3.4–5.3)
PROT SERPL-MCNC: 7 G/DL (ref 6.4–8.3)
PSA SERPL DL<=0.01 NG/ML-MCNC: 0.73 NG/ML (ref 0–6.5)
PTH-INTACT SERPL-MCNC: 63 PG/ML (ref 15–65)
RBC # BLD AUTO: 4.72 10E6/UL (ref 4.4–5.9)
SODIUM SERPL-SCNC: 141 MMOL/L (ref 135–145)
SP GR UR STRIP: 1.02 (ref 1–1.03)
TRIGL SERPL-MCNC: 78 MG/DL
TSH SERPL DL<=0.005 MIU/L-ACNC: 2.17 UIU/ML (ref 0.3–4.2)
UROBILINOGEN UR STRIP-ACNC: 0.2 E.U./DL
VIT D+METAB SERPL-MCNC: 47 NG/ML (ref 20–50)
WBC # BLD AUTO: 5.2 10E3/UL (ref 4–11)

## 2024-09-24 PROCEDURE — G0439 PPPS, SUBSEQ VISIT: HCPCS | Performed by: INTERNAL MEDICINE

## 2024-09-24 PROCEDURE — 80061 LIPID PANEL: CPT | Performed by: INTERNAL MEDICINE

## 2024-09-24 PROCEDURE — G0103 PSA SCREENING: HCPCS | Performed by: INTERNAL MEDICINE

## 2024-09-24 PROCEDURE — 80053 COMPREHEN METABOLIC PANEL: CPT | Performed by: INTERNAL MEDICINE

## 2024-09-24 PROCEDURE — 99214 OFFICE O/P EST MOD 30 MIN: CPT | Mod: 25 | Performed by: INTERNAL MEDICINE

## 2024-09-24 PROCEDURE — 85027 COMPLETE CBC AUTOMATED: CPT | Performed by: INTERNAL MEDICINE

## 2024-09-24 PROCEDURE — 84443 ASSAY THYROID STIM HORMONE: CPT | Performed by: INTERNAL MEDICINE

## 2024-09-24 PROCEDURE — 83970 ASSAY OF PARATHORMONE: CPT | Performed by: INTERNAL MEDICINE

## 2024-09-24 PROCEDURE — 36415 COLL VENOUS BLD VENIPUNCTURE: CPT | Performed by: INTERNAL MEDICINE

## 2024-09-24 PROCEDURE — 82306 VITAMIN D 25 HYDROXY: CPT | Performed by: INTERNAL MEDICINE

## 2024-09-24 PROCEDURE — 81003 URINALYSIS AUTO W/O SCOPE: CPT | Performed by: INTERNAL MEDICINE

## 2024-09-24 RX ORDER — ROSUVASTATIN CALCIUM 20 MG/1
20 TABLET, COATED ORAL DAILY
COMMUNITY
Start: 2024-09-24

## 2024-09-24 ASSESSMENT — PAIN SCALES - GENERAL: PAINLEVEL: NO PAIN (0)

## 2024-09-24 NOTE — LETTER
September 27, 2024      Boyd Veras  [address] [address]        Dear ,    We are writing to inform you of your test results.    Test results indicate you may require additional follow up, see comment below.    Resulted Orders   CBC with platelets   Result Value Ref Range    WBC Count 5.2 4.0 - 11.0 10e3/uL    RBC Count 4.72 4.40 - 5.90 10e6/uL    Hemoglobin 14.5 13.3 - 17.7 g/dL    Hematocrit 43.2 40.0 - 53.0 %    MCV 92 78 - 100 fL    MCH 30.7 26.5 - 33.0 pg    MCHC 33.6 31.5 - 36.5 g/dL    RDW 11.9 10.0 - 15.0 %    Platelet Count 211 150 - 450 10e3/uL   Comprehensive metabolic panel   Result Value Ref Range    Sodium 141 135 - 145 mmol/L    Potassium 5.5 (H) 3.4 - 5.3 mmol/L    Carbon Dioxide (CO2) 24 22 - 29 mmol/L    Anion Gap 11 7 - 15 mmol/L    Urea Nitrogen 22.2 8.0 - 23.0 mg/dL    Creatinine 1.16 0.67 - 1.17 mg/dL    GFR Estimate 66 >60 mL/min/1.73m2      Comment:      eGFR calculated using 2021 CKD-EPI equation.    Calcium 9.5 8.8 - 10.4 mg/dL      Comment:      Reference intervals for this test were updated on 7/16/2024 to reflect our healthy population more accurately. There may be differences in the flagging of prior results with similar values performed with this method. Those prior results can be interpreted in the context of the updated reference intervals.    Chloride 106 98 - 107 mmol/L    Glucose 90 70 - 99 mg/dL    Alkaline Phosphatase 94 40 - 150 U/L    AST 23 0 - 45 U/L    ALT 14 0 - 70 U/L    Protein Total 7.0 6.4 - 8.3 g/dL    Albumin 4.3 3.5 - 5.2 g/dL    Bilirubin Total 1.4 (H) <=1.2 mg/dL    Patient Fasting > 8hrs? Unknown    Lipid panel reflex to direct LDL Fasting   Result Value Ref Range    Cholesterol 268 (H) <200 mg/dL    Triglycerides 78 <150 mg/dL    Direct Measure HDL 73 >=40 mg/dL    LDL Cholesterol Calculated 179 (H) <100 mg/dL    Non HDL Cholesterol 195 (H) <130 mg/dL    Patient Fasting > 8hrs? Unknown     Narrative    Cholesterol  Desirable: <  200 mg/dL  Borderline High: 200 - 239 mg/dL  High: >= 240 mg/dL    Triglycerides  Normal: < 150 mg/dL  Borderline High: 150 - 199 mg/dL  High: 200-499 mg/dL  Very High: >= 500 mg/dL    Direct Measure HDL  Female: >= 50 mg/dL   Male: >= 40 mg/dL    LDL Cholesterol  Desirable: < 100 mg/dL  Above Desirable: 100 - 129 mg/dL   Borderline High: 130 - 159 mg/dL   High:  160 - 189 mg/dL   Very High: >= 190 mg/dL    Non HDL Cholesterol  Desirable: < 130 mg/dL  Above Desirable: 130 - 159 mg/dL  Borderline High: 160 - 189 mg/dL  High: 190 - 219 mg/dL  Very High: >= 220 mg/dL   Prostate Specific Antigen Screen   Result Value Ref Range    Prostate Specific Antigen Screen 0.73 0.00 - 6.50 ng/mL    Narrative    This result is obtained using the Roche Elecsys total PSA method on the sujata e801 immunoassay analyzer, which is an ultrasensitive method. Results obtained with different assay methods or kits cannot be used interchangeably.  This test is intended for initial prostate cancer screening. PSA values exceeding the age-specific limits are suspicious for prostate disease, but additional testing, such as prostate biopsy, is needed to diagnose prostate pathology. The American Cancer Society recommends annual examination with digital rectal examination and serum PSA beginning at age 50 and for men with a life expectancy of at least 10 years after detection of prostate cancer. For men in high-risk groups, such as  Americans or men with a first-degree relative diagnosed at a younger age, testing should begin at a younger age. It is generally recommended that information be provided to patients about the benefits and limitations of testing and treatment so they can make informed decisions.   TSH with free T4 reflex   Result Value Ref Range    TSH 2.17 0.30 - 4.20 uIU/mL   UA Macroscopic with reflex to Microscopic and Culture   Result Value Ref Range    Color Urine Yellow Colorless, Straw, Light Yellow, Yellow    Appearance  Urine Clear Clear    Glucose Urine Negative Negative mg/dL    Bilirubin Urine Negative Negative    Ketones Urine Negative Negative mg/dL    Specific Gravity Urine 1.025 1.005 - 1.030    Blood Urine Negative Negative    pH Urine 5.5 5.0 - 8.0    Protein Albumin Urine Negative Negative mg/dL    Urobilinogen Urine 0.2 0.2, 1.0 E.U./dL    Nitrite Urine Negative Negative    Leukocyte Esterase Urine Negative Negative    Narrative    Microscopic not indicated   Vitamin D Deficiency   Result Value Ref Range    Vitamin D, Total (25-Hydroxy) 47 20 - 50 ng/mL      Comment:      optimum levels    Narrative    Season, race, dietary intake, and treatment affect the concentration of 25-hydroxy-Vitamin D. Values may decrease during winter months and increase during summer months.    Vitamin D determination is routinely performed by an immunoassay specific for 25 hydroxyvitamin D3.  If an individual is on vitamin D2(ergocalciferol) supplementation, please specify 25 OH vitamin D2 and D3 level determination by LCMSMS test VITD23.     Parathyroid Hormone Intact   Result Value Ref Range    Parathyroid Hormone Intact 63 15 - 65 pg/mL    Narrative    This result was obtained with the Roche Elecsys PTH STAT assay.   This reference range differs from PTH assays used in other [facility] laboratories.     Your potassium is a little bit high.  I suspect this is a lab error. You could just go into the clinic in [place] and have a recheck potassium to confirm. Orders are placed for this.     Your cholesterol is also high and you should start taking a statin as we discussed    If you have any questions or concerns, please call the clinic at the number listed above.     Sincerely,    Mitchel Webster MD

## 2024-09-24 NOTE — PROGRESS NOTES
Preventive Care Visit  [facility]  Mitchel Webster MD, Internal Medicine  Sep 24, 2024      1. Annual physical exam  Is a 74-year-old man with issues as discussed below.  Ongoing healthy lifestyle discussed and recommended.  Discussed vaccinations and he defers    2. Screening for prostate cancer  - Prostate Specific Antigen Screen; Future  - Prostate Specific Antigen Screen    3. Coronary artery disease involving native coronary artery of native heart without angina pectoris  Strongly recommend he restart rosuvastatin which she will do.  Continue aspirin    4. Mixed hyperlipidemia  - rosuvastatin (CRESTOR) 20 MG tablet; Take 1 tablet (20 mg) by mouth daily.  - CBC with platelets; Future  - Comprehensive metabolic panel; Future  - Lipid panel reflex to direct LDL Fasting; Future  - TSH with free T4 reflex; Future  - UA Macroscopic with reflex to Microscopic and Culture; Future  - CBC with platelets  - Comprehensive metabolic panel  - Lipid panel reflex to direct LDL Fasting  - TSH with free T4 reflex  - UA Macroscopic with reflex to Microscopic and Culture    5. Idiopathic chronic gout of multiple sites without tophus  Continue allopurinol    6. Hypercalcemia  Recheck  - Vitamin D Deficiency; Future  - Parathyroid Hormone Intact; Future  - Vitamin D Deficiency  - Parathyroid Hormone Intact    7. Traumatic brain injury with loss of consciousness, subsequent encounter  Doing much better    Subjective   Boyd is a 74 year old, presenting for the following:  Physical (Sleeping concerns)        9/24/2024     6:59 AM   Additional Questions   Roomed by michela haley         Health Care Directive  Patient does not have a Health Care Directive or Living Will: Discussed advance care planning with patient; information given to patient to review.    HPI      9/24/2024   General Health   How would you rate your overall physical health? Excellent   Feel stress (tense, anxious, or unable to sleep) To some extent       (!) STRESS CONCERN      9/24/2024   Nutrition   Diet: Regular (no restrictions)            9/24/2024   Exercise   Days per week of moderate/strenous exercise 7 days            9/24/2024   Social Factors   Frequency of gathering with friends or relatives More than three times a week   Worry food won't last until get money to buy more No   Food not last or not have enough money for food? No   Do you have housing? (Housing is defined as stable permanent housing and does not include staying ouside in a car, in a tent, in an abandoned building, in an overnight shelter, or couch-surfing.) Yes   Are you worried about losing your housing? No   Lack of transportation? No   Unable to get utilities (heat,electricity)? No            9/24/2024   Fall Risk   Fallen 2 or more times in the past year? No   Trouble with walking or balance? No             9/24/2024   Activities of Daily Living- Home Safety   Needs help with the following daily activites None of the above   Safety concerns in the home None of the above            9/24/2024   Dental   Dentist two times every year? Yes            9/24/2024   Hearing Screening   Hearing concerns? None of the above            9/24/2024   Driving Risk Screening   Patient/family members have concerns about driving No            9/24/2024   General Alertness/Fatigue Screening   Have you been more tired than usual lately? (!) YES            9/24/2024   Urinary Incontinence Screening   Bothered by leaking urine in past 6 months No            9/24/2024   TB Screening   Were you born outside of the [place]? No            Today's PHQ-2 Score:       9/24/2024     7:08 AM   PHQ-2 ( 1999 [employer])   Q1: Little interest or pleasure in doing things 1   Q2: Feeling down, depressed or hopeless 1   PHQ-2 Score 2           9/24/2024   Substance Use   Alcohol more than 3/day or more than 7/wk No   Do you have a current opioid prescription? No   How severe/bad is pain from 1 to 10? 0/10 (No Pain)   Do you use any  other substances recreationally? No        Social History     Tobacco Use    Smoking status: Never    Smokeless tobacco: Never   Vaping Use    Vaping status: Never Used   Substance Use Topics    Alcohol use: Yes     Alcohol/week: 5.0 standard drinks of alcohol     Comment: 3-4 / week    Drug use: No           9/24/2024   AAA Screening   Family history of Abdominal Aortic Aneurysm (AAA)? No      ASCVD Risk   The 10-year ASCVD risk score (Kristina CHU, et al., 2019) is: 26.1%    Values used to calculate the score:      Age: 74 years      Sex: Male      Is Non- : No      Diabetic: No      Tobacco smoker: No      Systolic Blood Pressure: 134 mmHg      Is BP treated: No      HDL Cholesterol: 66 mg/dL      Total Cholesterol: 251 mg/dL        Reviewed and updated as needed this visit by Provider                    Current providers sharing in care for this patient include:  Patient Care Team:  Mitchel Webster MD as PCP - General (Internal Medicine)  Mitchel Webster MD as Assigned PCP    The following health maintenance items are reviewed in Epic and correct as of today:  Health Maintenance   Topic Date Due    Pneumococcal Vaccine: 65+ Years (1 of 2 - PCV) Never done    ZOSTER IMMUNIZATION (1 of 2) Never done    ANNUAL REVIEW OF HM ORDERS  01/03/2024    MEDICARE ANNUAL WELLNESS VISIT  06/06/2024    LIPID  06/06/2024    INFLUENZA VACCINE (1) 09/01/2024    COVID-19 Vaccine (2 - 2024-25 season) 09/01/2024    RSV VACCINE (1 - 1-dose 75+ series) Never done    FALL RISK ASSESSMENT  09/24/2025    COLORECTAL CANCER SCREENING  12/05/2025    GLUCOSE  11/01/2026    ADVANCE CARE PLANNING  06/06/2028    DTAP/TDAP/TD IMMUNIZATION (5 - Td or Tdap) 11/08/2032    HEPATITIS C SCREENING  Completed    PHQ-2 (once per calendar year)  Completed    HPV IMMUNIZATION  Aged Out    MENINGITIS IMMUNIZATION  Aged Out    RSV MONOCLONAL ANTIBODY  Aged Out          Objective    Exam  /81 (BP Location: Left arm,  "Patient Position: Sitting, Cuff Size: Adult Large)   Pulse 50   Temp 98.3  F (36.8  C)   Resp 11   Ht 1.778 m (5' 10\")   Wt 78.3 kg (172 lb 11.2 oz)   SpO2 99%   BMI 24.78 kg/m     Estimated body mass index is 24.78 kg/m  as calculated from the following:    Height as of this encounter: 1.778 m (5' 10\").    Weight as of this encounter: 78.3 kg (172 lb 11.2 oz).    Physical Exam  EYES: Eyelids, conjunctiva, and sclera were normal. Pupils were normal. Cornea, iris, and lens were normal bilaterally.  HEAD, EARS, NOSE, MOUTH, AND THROAT: Head and face were normal. Hearing was normal to voice and the ears were normal to external exam. Nose appearance was normal and there was no discharge.  NECK: Neck appearance was normal.   RESPIRATORY: Breathing pattern was normal and the chest moved symmetrically.  Lung sounds were normal and there were no abnormal sounds.  CARDIOVASCULAR: Heart rate and rhythm were normal.  S1 and S2 were normal and there were no extra sounds or murmurs. There was no peripheral edema.  GASTROINTESTINAL: The abdomen was normal in contour.  NEUROLOGIC: The patient was alert and oriented to person, place, time, and circumstance. Speech was normal. Cranial nerves were normal. Motor strength was normal for age. The patient was normally coordinated.  PSYCHIATRIC:  Mood and affect were normal and the patient had normal recent and remote memory. The patient's judgment and insight were normal.      9/24/2024   Mini Cog   Clock Draw Score 2 Normal   3 Item Recall 3 objects recalled   Mini Cog Total Score 5                 Signed Electronically by: Mitchel Webster MD    "

## 2024-09-24 NOTE — PATIENT INSTRUCTIONS
Patient Education   Preventive Care Advice   This is general advice given by our system to help you stay healthy. However, your care team may have specific advice just for you. Please talk to your care team about your preventive care needs.  Nutrition  Eat 5 or more servings of fruits and vegetables each day.  Try wheat bread, brown rice and whole grain pasta (instead of white bread, rice, and pasta).  Get enough calcium and vitamin D. Check the label on foods and aim for 100% of the RDA (recommended daily allowance).  Lifestyle  Exercise at least 150 minutes each week  (30 minutes a day, 5 days a week).  Do muscle strengthening activities 2 days a week. These help control your weight and prevent disease.  No smoking.  Wear sunscreen to prevent skin cancer.  Have a dental exam and cleaning every 6 months.  Yearly exams  See your health care team every year to talk about:  Any changes in your health.  Any medicines your care team has prescribed.  Preventive care, family planning, and ways to prevent chronic diseases.  Shots (vaccines)   HPV shots (up to age 26), if you've never had them before.  Hepatitis B shots (up to age 59), if you've never had them before.  COVID-19 shot: Get this shot when it's due.  Flu shot: Get a flu shot every year.  Tetanus shot: Get a tetanus shot every 10 years.  Pneumococcal, hepatitis A, and RSV shots: Ask your care team if you need these based on your risk.  Shingles shot (for age 50 and up)  General health tests  Diabetes screening:  Starting at age 35, Get screened for diabetes at least every 3 years.  If you are younger than age 35, ask your care team if you should be screened for diabetes.  Cholesterol test: At age 39, start having a cholesterol test every 5 years, or more often if advised.  Bone density scan (DEXA): At age 50, ask your care team if you should have this scan for osteoporosis (brittle bones).  Hepatitis C: Get tested at least once in your life.  STIs (sexually  transmitted infections)  Before age 24: Ask your care team if you should be screened for STIs.  After age 24: Get screened for STIs if you're at risk. You are at risk for STIs (including HIV) if:  You are sexually active with more than one person.  You don't use condoms every time.  You or a partner was diagnosed with a sexually transmitted infection.  If you are at risk for HIV, ask about PrEP medicine to prevent HIV.  Get tested for HIV at least once in your life, whether you are at risk for HIV or not.  Cancer screening tests  Cervical cancer screening: If you have a cervix, begin getting regular cervical cancer screening tests starting at age 21.  Breast cancer scan (mammogram): If you've ever had breasts, begin having regular mammograms starting at age 40. This is a scan to check for breast cancer.  Colon cancer screening: It is important to start screening for colon cancer at age 45.  Have a colonoscopy test every 10 years (or more often if you're at risk) Or, ask your provider about stool tests like a FIT test every year or Cologuard test every 3 years.  To learn more about your testing options, visit:   .  For help making a decision, visit:   https://bit.ly/cy21761.  Prostate cancer screening test: If you have a prostate, ask your care team if a prostate cancer screening test (PSA) at age 55 is right for you.  Lung cancer screening: If you are a current or former smoker ages 50 to 80, ask your care team if ongoing lung cancer screenings are right for you.  For informational purposes only. Not to replace the advice of your health care provider. Copyright   2023 [facility]. All rights reserved. Clinically reviewed by the [employer] [facility]. [employer] 432416 - REV 01/24.  Learning About Sleeping Well  What does sleeping well mean?     Sleeping well means getting enough sleep to feel good and stay healthy. How much sleep is enough varies among people.  The number of hours you  sleep and how you feel when you wake up are both important. If you do not feel refreshed, you probably need more sleep. Another sign of not getting enough sleep is feeling tired during the day.  Experts recommend that adults get at least 7 or more hours of sleep per day. Children and older adults need more sleep.  Why is getting enough sleep important?  Getting enough quality sleep is a basic part of good health. When your sleep suffers, your physical health, mood, and your thoughts can suffer too. You may find yourself feeling more grumpy or stressed. Not getting enough sleep also can lead to serious problems, including injury, accidents, anxiety, and depression.  What might cause poor sleeping?  Many things can cause sleep problems, including:  Changes to your sleep schedule.  Stress. Stress can be caused by fear about a single event, such as giving a speech. Or you may have ongoing stress, such as worry about work or school.  Depression, anxiety, and other mental or emotional conditions.  Changes in your sleep habits or surroundings. This includes changes that happen where you sleep, such as noise, light, or sleeping in a different bed. It also includes changes in your sleep pattern, such as having jet lag or working a late shift.  Health problems, such as pain, breathing problems, and restless legs syndrome.  Lack of regular exercise.  Using alcohol, nicotine, or caffeine before bed.  How can you help yourself?  Here are some tips that may help you sleep more soundly and wake up feeling more refreshed.  Your sleeping area   Use your bedroom only for sleeping and sex. A bit of light reading may help you fall asleep. But if it doesn't, do your reading elsewhere in the house. Try not to use your TV, computer, smartphone, or tablet while you are in bed.  Be sure your bed is big enough to stretch out comfortably, especially if you have a sleep partner.  Keep your bedroom quiet, dark, and cool. Use curtains, blinds,  "or a sleep mask to block out light. To block out noise, use earplugs, soothing music, or a \"white noise\" machine.  Your evening and bedtime routine   Create a relaxing bedtime routine. You might want to take a warm shower or bath, or listen to soothing music.  Go to bed at the same time every night. And get up at the same time every morning, even if you feel tired.  What to avoid   Limit caffeine (coffee, tea, caffeinated sodas) during the day, and don't have any for at least 6 hours before bedtime.  Avoid drinking alcohol before bedtime. Alcohol can cause you to wake up more often during the night.  Try not to smoke or use tobacco, especially in the evening. Nicotine can keep you awake.  Limit naps during the day, especially close to bedtime.  Avoid lying in bed awake for too long. If you can't fall asleep or if you wake up in the middle of the night and can't get back to sleep within about 20 minutes, get out of bed and go to another room until you feel sleepy.  Avoid taking medicine right before bed that may keep you awake or make you feel hyper or energized. Your doctor can tell you if your medicine may do this and if you can take it earlier in the day.  If you can't sleep   Imagine yourself in a peaceful, pleasant scene. Focus on the details and feelings of being in a place that is relaxing.  Get up and do a quiet or boring activity until you feel sleepy.  Avoid drinking any liquids before going to bed to help prevent waking up often to use the bathroom.  Where can you learn more?  Go to https://www.[employer].net/patiented  Enter J942 in the search box to learn more about \"Learning About Sleeping Well.\"  Current as of: July 10, 2023  Content Version: 14.1 2006-2024 [employer].   Care instructions adapted under license by your healthcare professional. If you have questions about a medical condition or this instruction, always ask your healthcare professional. [employer] disclaims " any warranty or liability for your use of this information.

## 2024-09-27 ENCOUNTER — TELEPHONE (OUTPATIENT)
Dept: INTERNAL MEDICINE | Facility: CLINIC | Age: 74
End: 2024-09-27
Payer: COMMERCIAL

## 2024-09-27 NOTE — TELEPHONE ENCOUNTER
Spoke with patient and relayed information below from Dr Webster. Patient verbalized understanding and has no further questions.       Mitchel Webster MD  9/25/2024  1:24 PM CDT       Please call the patient and let him know that his potassium is a little bit high.  I suspect this is a lab error.  He could just go into the clinic in [place] and have a recheck potassium to confirm.     His cholesterol is also high and he is going to start taking a statin as we discussed

## 2024-12-16 ENCOUNTER — TELEPHONE (OUTPATIENT)
Dept: INTERNAL MEDICINE | Facility: CLINIC | Age: 74
End: 2024-12-16
Payer: COMMERCIAL

## 2024-12-16 NOTE — TELEPHONE ENCOUNTER
Patient's wife calling in wondering if the patient can get a titer to determine his vaccination status for whooping cough.

## 2024-12-16 NOTE — TELEPHONE ENCOUNTER
Spoke with patient's wife Lizette and relayed information below from Dr Webster. She verbalized understanding and has no further questions.

## 2025-04-17 SDOH — HEALTH STABILITY: PHYSICAL HEALTH: ON AVERAGE, HOW MANY MINUTES DO YOU ENGAGE IN EXERCISE AT THIS LEVEL?: 50 MIN

## 2025-04-17 SDOH — HEALTH STABILITY: PHYSICAL HEALTH: ON AVERAGE, HOW MANY DAYS PER WEEK DO YOU ENGAGE IN MODERATE TO STRENUOUS EXERCISE (LIKE A BRISK WALK)?: 7 DAYS

## 2025-04-17 ASSESSMENT — SOCIAL DETERMINANTS OF HEALTH (SDOH): HOW OFTEN DO YOU GET TOGETHER WITH FRIENDS OR RELATIVES?: MORE THAN THREE TIMES A WEEK

## 2025-04-22 ENCOUNTER — OFFICE VISIT (OUTPATIENT)
Dept: INTERNAL MEDICINE | Facility: CLINIC | Age: 75
End: 2025-04-22
Payer: MEDICARE

## 2025-04-22 VITALS
OXYGEN SATURATION: 97 % | SYSTOLIC BLOOD PRESSURE: 138 MMHG | TEMPERATURE: 97.6 F | HEIGHT: 69 IN | RESPIRATION RATE: 18 BRPM | BODY MASS INDEX: 25.98 KG/M2 | DIASTOLIC BLOOD PRESSURE: 86 MMHG | HEART RATE: 62 BPM | WEIGHT: 175.4 LBS

## 2025-04-22 DIAGNOSIS — R73.9 HYPERGLYCEMIA: ICD-10-CM

## 2025-04-22 DIAGNOSIS — M1A.09X0 IDIOPATHIC CHRONIC GOUT OF MULTIPLE SITES WITHOUT TOPHUS: ICD-10-CM

## 2025-04-22 DIAGNOSIS — Z00.00 ANNUAL PHYSICAL EXAM: Primary | ICD-10-CM

## 2025-04-22 DIAGNOSIS — I25.10 CORONARY ARTERY DISEASE INVOLVING NATIVE CORONARY ARTERY OF NATIVE HEART WITHOUT ANGINA PECTORIS: ICD-10-CM

## 2025-04-22 DIAGNOSIS — Z12.5 SCREENING FOR PROSTATE CANCER: ICD-10-CM

## 2025-04-22 DIAGNOSIS — E78.2 MIXED HYPERLIPIDEMIA: ICD-10-CM

## 2025-04-22 DIAGNOSIS — I10 ESSENTIAL HYPERTENSION: ICD-10-CM

## 2025-04-22 LAB
ALBUMIN UR-MCNC: NEGATIVE MG/DL
APPEARANCE UR: CLEAR
BILIRUB UR QL STRIP: NEGATIVE
COLOR UR AUTO: YELLOW
ERYTHROCYTE [DISTWIDTH] IN BLOOD BY AUTOMATED COUNT: 11.9 % (ref 10–15)
EST. AVERAGE GLUCOSE BLD GHB EST-MCNC: 103 MG/DL
GLUCOSE UR STRIP-MCNC: NEGATIVE MG/DL
HBA1C MFR BLD: 5.2 % (ref 0–5.6)
HCT VFR BLD AUTO: 41.3 % (ref 40–53)
HGB BLD-MCNC: 14.3 G/DL (ref 13.3–17.7)
HGB UR QL STRIP: NEGATIVE
KETONES UR STRIP-MCNC: ABNORMAL MG/DL
LEUKOCYTE ESTERASE UR QL STRIP: NEGATIVE
MCH RBC QN AUTO: 30.7 PG (ref 26.5–33)
MCHC RBC AUTO-ENTMCNC: 34.6 G/DL (ref 31.5–36.5)
MCV RBC AUTO: 89 FL (ref 78–100)
NITRATE UR QL: NEGATIVE
PH UR STRIP: 5.5 [PH] (ref 5–8)
PLATELET # BLD AUTO: 209 10E3/UL (ref 150–450)
RBC # BLD AUTO: 4.66 10E6/UL (ref 4.4–5.9)
SP GR UR STRIP: 1.02 (ref 1–1.03)
UROBILINOGEN UR STRIP-ACNC: 0.2 E.U./DL
WBC # BLD AUTO: 5.9 10E3/UL (ref 4–11)

## 2025-04-22 PROCEDURE — 84443 ASSAY THYROID STIM HORMONE: CPT | Performed by: INTERNAL MEDICINE

## 2025-04-22 PROCEDURE — 80061 LIPID PANEL: CPT | Performed by: INTERNAL MEDICINE

## 2025-04-22 PROCEDURE — 3075F SYST BP GE 130 - 139MM HG: CPT | Performed by: INTERNAL MEDICINE

## 2025-04-22 PROCEDURE — G2211 COMPLEX E/M VISIT ADD ON: HCPCS | Performed by: INTERNAL MEDICINE

## 2025-04-22 PROCEDURE — 84550 ASSAY OF BLOOD/URIC ACID: CPT | Performed by: INTERNAL MEDICINE

## 2025-04-22 PROCEDURE — G0103 PSA SCREENING: HCPCS | Performed by: INTERNAL MEDICINE

## 2025-04-22 PROCEDURE — 81003 URINALYSIS AUTO W/O SCOPE: CPT | Performed by: INTERNAL MEDICINE

## 2025-04-22 PROCEDURE — 83036 HEMOGLOBIN GLYCOSYLATED A1C: CPT | Performed by: INTERNAL MEDICINE

## 2025-04-22 PROCEDURE — 36415 COLL VENOUS BLD VENIPUNCTURE: CPT | Performed by: INTERNAL MEDICINE

## 2025-04-22 PROCEDURE — G0439 PPPS, SUBSEQ VISIT: HCPCS | Mod: 4MD | Performed by: INTERNAL MEDICINE

## 2025-04-22 PROCEDURE — 99214 OFFICE O/P EST MOD 30 MIN: CPT | Mod: 25 | Performed by: INTERNAL MEDICINE

## 2025-04-22 PROCEDURE — 85027 COMPLETE CBC AUTOMATED: CPT | Performed by: INTERNAL MEDICINE

## 2025-04-22 PROCEDURE — 3079F DIAST BP 80-89 MM HG: CPT | Performed by: INTERNAL MEDICINE

## 2025-04-22 PROCEDURE — 80053 COMPREHEN METABOLIC PANEL: CPT | Performed by: INTERNAL MEDICINE

## 2025-04-22 RX ORDER — AMLODIPINE BESYLATE AND ATORVASTATIN CALCIUM 5; 40 MG/1; MG/1
1 TABLET, FILM COATED ORAL AT BEDTIME
Qty: 90 TABLET | Refills: 4 | Status: SHIPPED | OUTPATIENT
Start: 2025-04-22

## 2025-04-22 RX ORDER — ROSUVASTATIN CALCIUM 20 MG/1
20 TABLET, COATED ORAL DAILY
COMMUNITY
Start: 2025-04-22 | End: 2025-04-22

## 2025-04-22 RX ORDER — NITROGLYCERIN 0.4 MG/1
0.4 TABLET SUBLINGUAL EVERY 5 MIN PRN
Qty: 30 TABLET | Refills: 1 | Status: SHIPPED | OUTPATIENT
Start: 2025-04-22

## 2025-04-22 NOTE — PROGRESS NOTES
Office Visit - Physical   Boyd Veras   74 year old  male    Date of visit: 4/22/2025  Physician: Mitchel Webster MD     Assessment and Plan   1. Annual physical exam (Primary)  This 74-year-old man with issues as discussed below.  Ongoing healthy lifestyle discussed and recommended.  Discussed important risk factor modification in terms of dementia including vascular risk modification, exercise, healthy diet, social interaction, hearing aids.  We also discussed shingles vaccination    2. Screening for prostate cancer  - Prostate Specific Antigen Screen; Future  - Prostate Specific Antigen Screen    3. Hyperglycemia  - Hemoglobin A1c; Future  - Hemoglobin A1c    4. CAD, no MI, LYNDSEY LADx2 2014, RCA 2016  Encouraged him to start a statin and he states that he will start this now    5. Essential hypertension  Blood pressure is a little bit high, add amlodipine  - amLODIPine-atorvastatin (CADUET) 5-40 MG tablet; Take 1 tablet by mouth at bedtime.  Dispense: 90 tablet; Refill: 4    6. Mixed hyperlipidemia  - amLODIPine-atorvastatin (CADUET) 5-40 MG tablet; Take 1 tablet by mouth at bedtime.  Dispense: 90 tablet; Refill: 4  - CBC with platelets; Future  - Comprehensive metabolic panel; Future  - Lipid panel reflex to direct LDL Fasting; Future  - TSH with free T4 reflex; Future  - UA Macroscopic with reflex to Microscopic and Culture; Future  - CBC with platelets  - Comprehensive metabolic panel  - Lipid panel reflex to direct LDL Fasting  - TSH with free T4 reflex  - UA Macroscopic with reflex to Microscopic and Culture    7. Idiopathic chronic gout of multiple sites without tophus  Continue allopurinol  - Uric acid; Future  - Uric acid      Return in about 3 months (around 7/22/2025) for Follow up.     Chief Complaint   Chief Complaint   Patient presents with    Pre-Op Exam     Pt here for pre op exam.        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  One grandchild.        Past Medical History   Patient Active Problem List   Diagnosis    Idiopathic chronic gout of multiple sites without tophus    CAD, no MI, LYNDSEY LADx2 2014, RCA 2016    Mixed hyperlipidemia       Past Surgical History  He has a past surgical history that includes Arthroscopy shoulder (Right); Knee Arthroscopy W/ Acl Reconstruction And Patella Graft (2023); and Total Knee Arthroplasty (Right).     History of Present Illness   This 74 year old man comes in for annual physical.  Overall feeling well.  Has some questions about preventing memory loss.    Review of Systems: A comprehensive review of systems was negative except as noted.     Medications and Allergies   Current Outpatient Medications   Medication Sig Dispense Refill    allopurinol (ZYLOPRIM) 100 MG tablet TAKE 1 TABLET BY MOUTH EVERY DAY 90 tablet 4    amLODIPine-atorvastatin (CADUET) 5-40 MG tablet Take 1 tablet by mouth at bedtime. 90 tablet 4    ascorbic acid, vitamin C, (VITAMIN C) 500 MG tablet [ASCORBIC ACID, VITAMIN C, (VITAMIN C) 500 MG TABLET] Take 500 mg by mouth.      aspirin 81 MG EC tablet [ASPIRIN 81 MG EC TABLET] Take 81 mg by mouth daily.      multivitamin with minerals tablet [MULTIVITAMIN WITH MINERALS TABLET] Take 1 tablet by mouth.      nitroGLYcerin (NITROSTAT) 0.4 MG sublingual tablet Place 1 tablet (0.4 mg) under the tongue every 5 minutes as needed for chest pain. 30 tablet 1    sildenafil (VIAGRA) 100 MG tablet Take 0.5-1 tablets ( mg) by mouth daily as needed (ED) 30 tablet 4     Allergies   Allergen Reactions    Isosorbide Mononitrate [Isosorbide Nitrate] Headache        Family and Social History   Family History   Problem Relation Age of Onset    Dementia Mother     Cancer Father         mesothelioma    Heart Disease Sister     Cancer Sister     No Known Problems Sister     Coronary Artery Disease Brother     Stomach Cancer Brother     No Known Problems Brother     No Known  "Problems Brother     No Known Problems Daughter     No Known Problems Daughter     No Known Problems Daughter     No Known Problems Daughter     No Known Problems Daughter     No Known Problems Son         Social History     Tobacco Use    Smoking status: Never    Smokeless tobacco: Never   Vaping Use    Vaping status: Never Used   Substance Use Topics    Alcohol use: Not Currently     Comment: 3-4 / week    Drug use: No        Physical Exam   General Appearance:   No acute distress    /86   Pulse 62   Temp 97.6  F (36.4  C) (Tympanic)   Resp 18   Ht 1.753 m (5' 9\")   Wt 79.6 kg (175 lb 6.4 oz)   SpO2 97%   BMI 25.90 kg/m      EYES: Eyelids, conjunctiva, and sclera were normal.   HEAD, EARS, NOSE, MOUTH, AND THROAT: Head and face were normal. Hearing was normal to voice and the ears were normal to external exam. Nose appearance was normal and there was no discharge.  NECK: Neck appearance was normal.   RESPIRATORY: Breathing pattern was normal and the chest moved symmetrically.   Lung sounds were normal and there were no abnormal sounds.  CARDIOVASCULAR: Heart rate and rhythm were normal.  S1 and S2 were normal and there were no extra sounds or murmurs.   There was no peripheral edema.  GASTROINTESTINAL: The abdomen was normal in contour.   NEUROLOGIC: The patient was alert and oriented to person, place, time, and circumstance. Speech was normal. Cranial nerves were normal. Motor strength was normal for age. The patient was normally coordinated.  PSYCHIATRIC:  Mood and affect were normal and the patient had normal recent and remote memory. The patient's judgment and insight were normal.       Additional Information   The longitudinal plan of care for the diagnosis(es)/condition(s) as documented were addressed during this visit. Due to the added complexity in care, I will continue to support Boyd in the subsequent management and with ongoing continuity of care.       Mitchel Webster MD  Internal " Medicine  Contact me at [phone]

## 2025-04-22 NOTE — LETTER
April 24, 2025      Boyd Veras  [address] [address]        Dear ,    We are writing to inform you of your test results.    Your labs generally look okay/stable, which is excellent.  Your cholesterol is elevated and I would like you to start the medication as we discussed.    Resulted Orders   CBC with platelets   Result Value Ref Range    WBC Count 5.9 4.0 - 11.0 10e3/uL    RBC Count 4.66 4.40 - 5.90 10e6/uL    Hemoglobin 14.3 13.3 - 17.7 g/dL    Hematocrit 41.3 40.0 - 53.0 %    MCV 89 78 - 100 fL    MCH 30.7 26.5 - 33.0 pg    MCHC 34.6 31.5 - 36.5 g/dL    RDW 11.9 10.0 - 15.0 %    Platelet Count 209 150 - 450 10e3/uL   Comprehensive metabolic panel   Result Value Ref Range    Sodium 138 135 - 145 mmol/L    Potassium 5.0 3.4 - 5.3 mmol/L    Carbon Dioxide (CO2) 27 22 - 29 mmol/L    Anion Gap 9 7 - 15 mmol/L    Urea Nitrogen 17.8 8.0 - 23.0 mg/dL    Creatinine 1.21 (H) 0.67 - 1.17 mg/dL    GFR Estimate 62 >60 mL/min/1.73m2      Comment:      eGFR calculated using 2021 CKD-EPI equation.    Calcium 9.4 8.8 - 10.4 mg/dL    Chloride 102 98 - 107 mmol/L    Glucose 76 70 - 99 mg/dL    Alkaline Phosphatase 98 40 - 150 U/L    AST 30 0 - 45 U/L    ALT 25 0 - 70 U/L    Protein Total 6.9 6.4 - 8.3 g/dL    Albumin 4.4 3.5 - 5.2 g/dL    Bilirubin Total 1.5 (H) <=1.2 mg/dL    Patient Fasting > 8hrs? Yes    Hemoglobin A1c   Result Value Ref Range    Estimated Average Glucose 103 <117 mg/dL    Hemoglobin A1C 5.2 0.0 - 5.6 %      Comment:      Normal <5.7%   Prediabetes 5.7-6.4%    Diabetes 6.5% or higher     Note: Adopted from ADA consensus guidelines.   Lipid panel reflex to direct LDL Fasting   Result Value Ref Range    Cholesterol 268 (H) <200 mg/dL    Triglycerides 85 <150 mg/dL    Direct Measure HDL 75 >=40 mg/dL    LDL Cholesterol Calculated 176 (H) <100 mg/dL    Non HDL Cholesterol 193 (H) <130 mg/dL    Patient Fasting > 8hrs? Yes     Narrative    Cholesterol  Desirable: < 200  mg/dL  Borderline High: 200 - 239 mg/dL  High: >= 240 mg/dL    Triglycerides  Normal: < 150 mg/dL  Borderline High: 150 - 199 mg/dL  High: 200-499 mg/dL  Very High: >= 500 mg/dL    Direct Measure HDL  Female: >= 50 mg/dL   Male: >= 40 mg/dL    LDL Cholesterol  Desirable: < 100 mg/dL  Above Desirable: 100 - 129 mg/dL   Borderline High: 130 - 159 mg/dL   High:  160 - 189 mg/dL   Very High: >= 190 mg/dL    Non HDL Cholesterol  Desirable: < 130 mg/dL  Above Desirable: 130 - 159 mg/dL  Borderline High: 160 - 189 mg/dL  High: 190 - 219 mg/dL  Very High: >= 220 mg/dL   Prostate Specific Antigen Screen   Result Value Ref Range    Prostate Specific Antigen Screen 0.66 0.00 - 6.50 ng/mL    Narrative    This result is obtained using the Roche Elecsys total PSA method on the sujata e801 immunoassay analyzer, which is an ultrasensitive method. Results obtained with different assay methods or kits cannot be used interchangeably.  This test is intended for initial prostate cancer screening. PSA values exceeding the age-specific limits are suspicious for prostate disease, but additional testing, such as prostate biopsy, is needed to diagnose prostate pathology. The American Cancer Society recommends annual examination with digital rectal examination and serum PSA beginning at age 50 and for men with a life expectancy of at least 10 years after detection of prostate cancer. For men in high-risk groups, such as  Americans or men with a first-degree relative diagnosed at a younger age, testing should begin at a younger age. It is generally recommended that information be provided to patients about the benefits and limitations of testing and treatment so they can make informed decisions.   TSH with free T4 reflex   Result Value Ref Range    TSH 2.04 0.30 - 4.20 uIU/mL   UA Macroscopic with reflex to Microscopic and Culture   Result Value Ref Range    Color Urine Yellow Colorless, Straw, Light Yellow, Yellow    Appearance Urine  Clear Clear    Glucose Urine Negative Negative mg/dL    Bilirubin Urine Negative Negative    Ketones Urine Trace (A) Negative mg/dL    Specific Gravity Urine 1.020 1.005 - 1.030    Blood Urine Negative Negative    pH Urine 5.5 5.0 - 8.0    Protein Albumin Urine Negative Negative mg/dL    Urobilinogen Urine 0.2 0.2, 1.0 E.U./dL    Nitrite Urine Negative Negative    Leukocyte Esterase Urine Negative Negative    Narrative    Microscopic not indicated   Uric acid   Result Value Ref Range    Uric Acid 5.5 3.4 - 7.0 mg/dL       If you have any questions or concerns, please call the clinic at the number listed above.       Sincerely,      Mitchel Webster MD    Electronically signed

## 2025-04-23 LAB
ALBUMIN SERPL BCG-MCNC: 4.4 G/DL (ref 3.5–5.2)
ALP SERPL-CCNC: 98 U/L (ref 40–150)
ALT SERPL W P-5'-P-CCNC: 25 U/L (ref 0–70)
ANION GAP SERPL CALCULATED.3IONS-SCNC: 9 MMOL/L (ref 7–15)
AST SERPL W P-5'-P-CCNC: 30 U/L (ref 0–45)
BILIRUB SERPL-MCNC: 1.5 MG/DL
BUN SERPL-MCNC: 17.8 MG/DL (ref 8–23)
CALCIUM SERPL-MCNC: 9.4 MG/DL (ref 8.8–10.4)
CHLORIDE SERPL-SCNC: 102 MMOL/L (ref 98–107)
CHOLEST SERPL-MCNC: 268 MG/DL
CREAT SERPL-MCNC: 1.21 MG/DL (ref 0.67–1.17)
EGFRCR SERPLBLD CKD-EPI 2021: 62 ML/MIN/1.73M2
FASTING STATUS PATIENT QL REPORTED: YES
FASTING STATUS PATIENT QL REPORTED: YES
GLUCOSE SERPL-MCNC: 76 MG/DL (ref 70–99)
HCO3 SERPL-SCNC: 27 MMOL/L (ref 22–29)
HDLC SERPL-MCNC: 75 MG/DL
LDLC SERPL CALC-MCNC: 176 MG/DL
NONHDLC SERPL-MCNC: 193 MG/DL
POTASSIUM SERPL-SCNC: 5 MMOL/L (ref 3.4–5.3)
PROT SERPL-MCNC: 6.9 G/DL (ref 6.4–8.3)
PSA SERPL DL<=0.01 NG/ML-MCNC: 0.66 NG/ML (ref 0–6.5)
SODIUM SERPL-SCNC: 138 MMOL/L (ref 135–145)
TRIGL SERPL-MCNC: 85 MG/DL
TSH SERPL DL<=0.005 MIU/L-ACNC: 2.04 UIU/ML (ref 0.3–4.2)
URATE SERPL-MCNC: 5.5 MG/DL (ref 3.4–7)